# Patient Record
Sex: FEMALE | Race: OTHER | NOT HISPANIC OR LATINO
[De-identification: names, ages, dates, MRNs, and addresses within clinical notes are randomized per-mention and may not be internally consistent; named-entity substitution may affect disease eponyms.]

---

## 2021-05-04 ENCOUNTER — APPOINTMENT (OUTPATIENT)
Dept: BARIATRICS | Facility: CLINIC | Age: 51
End: 2021-05-04
Payer: MEDICAID

## 2021-05-04 VITALS
OXYGEN SATURATION: 100 % | HEIGHT: 66 IN | HEART RATE: 85 BPM | SYSTOLIC BLOOD PRESSURE: 166 MMHG | BODY MASS INDEX: 44.03 KG/M2 | TEMPERATURE: 97.1 F | DIASTOLIC BLOOD PRESSURE: 111 MMHG | WEIGHT: 274 LBS

## 2021-05-04 DIAGNOSIS — R12 HEARTBURN: ICD-10-CM

## 2021-05-04 DIAGNOSIS — I10 ESSENTIAL (PRIMARY) HYPERTENSION: ICD-10-CM

## 2021-05-04 DIAGNOSIS — M19.90 UNSPECIFIED OSTEOARTHRITIS, UNSPECIFIED SITE: ICD-10-CM

## 2021-05-04 PROCEDURE — 99204 OFFICE O/P NEW MOD 45 MIN: CPT

## 2021-05-04 PROCEDURE — 99072 ADDL SUPL MATRL&STAF TM PHE: CPT

## 2021-05-04 NOTE — HISTORY OF PRESENT ILLNESS
[de-identified] : Patient is a 51 yo F with a h/o morbid obesity and a BMI of 44 presenting for weight loss surgery consultation. Patient has h/o HTN on amlodipine and no PSH. Patient experiences GERD once every 3 days. \par \par We discussed at length surgical and non-surgical options and that non surgical approaches are unlikely to lead to long term, sustained weight loss. We also discussed that surgery alone is unlikely to be successful but should rather be seen as a tool for weight loss to be integrated with physical activity and nutritional counseling. The patient verbalized understanding and agrees to proceed with the evaluation. All risks of surgery were explained to the patient including the risks of leaks, infections, blood clots and death.\par

## 2021-05-04 NOTE — ASSESSMENT
[FreeTextEntry1] : Patient is a 49 yo F with a h/o morbid obesity and a BMI of 44 presenting for weight loss surgery consultation. Patient has h/o HTN on amlodipine and no PSH. Patient experiences GERD once every 3 days. \par \par We discussed at length surgical and non-surgical options and that non surgical approaches are unlikely to lead to long term, sustained weight loss. We also discussed that surgery alone is unlikely to be successful but should rather be seen as a tool for weight loss to be integrated with physical activity and nutritional counseling. The patient verbalized understanding and agrees to proceed with the evaluation. All risks of surgery were explained to the patient including the risks of leaks, infections, blood clots and death.

## 2021-05-05 LAB
25(OH)D3 SERPL-MCNC: 12.4 NG/ML
ALBUMIN SERPL ELPH-MCNC: 4.6 G/DL
ALP BLD-CCNC: 53 U/L
ALT SERPL-CCNC: 9 U/L
ANION GAP SERPL CALC-SCNC: 14 MMOL/L
APPEARANCE: CLEAR
APTT BLD: 34.2 SEC
AST SERPL-CCNC: 15 U/L
BACTERIA: ABNORMAL
BASOPHILS # BLD AUTO: 0.03 K/UL
BASOPHILS NFR BLD AUTO: 0.4 %
BILIRUB SERPL-MCNC: 0.4 MG/DL
BILIRUBIN URINE: NEGATIVE
BLOOD URINE: ABNORMAL
BUN SERPL-MCNC: 9 MG/DL
CA-I SERPL-SCNC: 1.26 MMOL/L
CALCIUM SERPL-MCNC: 9.7 MG/DL
CALCIUM SERPL-MCNC: 9.7 MG/DL
CHLORIDE SERPL-SCNC: 105 MMOL/L
CHOLEST SERPL-MCNC: 213 MG/DL
CO2 SERPL-SCNC: 22 MMOL/L
COLOR: YELLOW
CREAT SERPL-MCNC: 0.96 MG/DL
EOSINOPHIL # BLD AUTO: 0.16 K/UL
EOSINOPHIL NFR BLD AUTO: 2.1 %
FOLATE SERPL-MCNC: 3.8 NG/ML
GLUCOSE QUALITATIVE U: NEGATIVE
GLUCOSE SERPL-MCNC: 99 MG/DL
HCG SERPL QL: NEGATIVE
HCT VFR BLD CALC: 40.8 %
HDLC SERPL-MCNC: 43 MG/DL
HGB BLD-MCNC: 12.4 G/DL
HYALINE CASTS: 0 /LPF
IMM GRANULOCYTES NFR BLD AUTO: 0.3 %
INR PPP: 1.1 RATIO
IRON SATN MFR SERPL: 16 %
IRON SERPL-MCNC: 57 UG/DL
KETONES URINE: NEGATIVE
LDLC SERPL CALC-MCNC: 149 MG/DL
LEUKOCYTE ESTERASE URINE: ABNORMAL
LYMPHOCYTES # BLD AUTO: 2.05 K/UL
LYMPHOCYTES NFR BLD AUTO: 26.5 %
MAN DIFF?: NORMAL
MCHC RBC-ENTMCNC: 28.2 PG
MCHC RBC-ENTMCNC: 30.4 GM/DL
MCV RBC AUTO: 92.7 FL
MICROSCOPIC-UA: NORMAL
MONOCYTES # BLD AUTO: 0.53 K/UL
MONOCYTES NFR BLD AUTO: 6.9 %
NEUTROPHILS # BLD AUTO: 4.94 K/UL
NEUTROPHILS NFR BLD AUTO: 63.8 %
NITRITE URINE: NEGATIVE
NONHDLC SERPL-MCNC: 170 MG/DL
PAPP-A SERPL-ACNC: <1 MIU/ML
PARATHYROID HORMONE INTACT: 53 PG/ML
PH URINE: 6
PLATELET # BLD AUTO: 330 K/UL
POTASSIUM SERPL-SCNC: 4.3 MMOL/L
PREALB SERPL NEPH-MCNC: 25 MG/DL
PROT SERPL-MCNC: 7.6 G/DL
PROTEIN URINE: ABNORMAL
PT BLD: 12.9 SEC
RBC # BLD: 4.4 M/UL
RBC # FLD: 15.2 %
RED BLOOD CELLS URINE: 7 /HPF
SODIUM SERPL-SCNC: 140 MMOL/L
SPECIFIC GRAVITY URINE: 1.03
SQUAMOUS EPITHELIAL CELLS: 5 /HPF
TIBC SERPL-MCNC: 366 UG/DL
TRIGL SERPL-MCNC: 105 MG/DL
TSH SERPL-ACNC: 1.36 UIU/ML
UIBC SERPL-MCNC: 309 UG/DL
UROBILINOGEN URINE: NORMAL
VIT B12 SERPL-MCNC: 231 PG/ML
WBC # FLD AUTO: 7.73 K/UL
WHITE BLOOD CELLS URINE: 6 /HPF

## 2021-05-07 DIAGNOSIS — R06.02 SHORTNESS OF BREATH: ICD-10-CM

## 2021-05-08 LAB — ZINC SERPL-MCNC: 115 UG/DL

## 2021-05-09 LAB
A-TOCOPHEROL VIT E SERPL-MCNC: 10.8 MG/L
BETA+GAMMA TOCOPHEROL SERPL-MCNC: 2 MG/L
VIT A SERPL-MCNC: 33.6 UG/DL
VIT B1 SERPL-MCNC: 100.1 NMOL/L

## 2021-05-11 LAB
H PYLORI AB SER-ACNC: <5 UNITS
H PYLORI IGA SER-ACNC: 12.2 UNITS

## 2021-05-13 ENCOUNTER — APPOINTMENT (OUTPATIENT)
Dept: BARIATRICS | Facility: CLINIC | Age: 51
End: 2021-05-13
Payer: MEDICAID

## 2021-05-13 ENCOUNTER — APPOINTMENT (OUTPATIENT)
Dept: PULMONOLOGY | Facility: CLINIC | Age: 51
End: 2021-05-13
Payer: MEDICAID

## 2021-05-13 VITALS
HEART RATE: 94 BPM | OXYGEN SATURATION: 98 % | HEIGHT: 66 IN | DIASTOLIC BLOOD PRESSURE: 98 MMHG | BODY MASS INDEX: 44.03 KG/M2 | SYSTOLIC BLOOD PRESSURE: 166 MMHG | TEMPERATURE: 97.6 F | WEIGHT: 274 LBS

## 2021-05-13 DIAGNOSIS — Z01.811 ENCOUNTER FOR PREPROCEDURAL RESPIRATORY EXAMINATION: ICD-10-CM

## 2021-05-13 PROCEDURE — 99204 OFFICE O/P NEW MOD 45 MIN: CPT | Mod: 25

## 2021-05-13 PROCEDURE — 94618 PULMONARY STRESS TESTING: CPT

## 2021-05-13 PROCEDURE — 94726 PLETHYSMOGRAPHY LUNG VOLUMES: CPT

## 2021-05-13 PROCEDURE — ZZZZZ: CPT

## 2021-05-13 PROCEDURE — 99072 ADDL SUPL MATRL&STAF TM PHE: CPT

## 2021-05-13 PROCEDURE — 94729 DIFFUSING CAPACITY: CPT

## 2021-05-13 PROCEDURE — 94060 EVALUATION OF WHEEZING: CPT

## 2021-05-13 PROCEDURE — 97802 MEDICAL NUTRITION INDIV IN: CPT | Mod: 95

## 2021-05-13 RX ORDER — AMLODIPINE BESYLATE 10 MG/1
10 TABLET ORAL
Refills: 0 | Status: ACTIVE | COMMUNITY

## 2021-05-13 RX ORDER — OXYCODONE HYDROCHLORIDE AND ACETAMINOPHEN 10; 325 MG/1; MG/1
10-325 TABLET ORAL
Refills: 0 | Status: ACTIVE | COMMUNITY

## 2021-05-13 RX ORDER — TIZANIDINE 4 MG/1
4 TABLET ORAL
Refills: 0 | Status: ACTIVE | COMMUNITY

## 2021-05-13 NOTE — HISTORY OF PRESENT ILLNESS
[TextBox_4] : 50 year old female, never smoker with obesity and HTN was referred to pulmonary clinic by Dr. Lopez for pulmonary evaluation before bariatric surgery.\par Patient denies cough, SOB, chest pain or pulmonary illness in last 1 year. She is able to go three flights of stairs without any difficulty in breathing. Patient c/o mild snoring but no witnessed apnea, early morning headache or choking at night time or excessive daytime sleepiness. ESS score is 8.\par

## 2021-05-13 NOTE — REVIEW OF SYSTEMS
[Fever] : no fever [Chills] : no chills [Dry Eyes] : no dry eyes [Cough] : no cough [Eye Irritation] : no eye irritation [Sputum] : no sputum [Dyspnea] : no dyspnea [SOB on Exertion] : no sob on exertion [Chest Discomfort] : no chest discomfort [Orthopnea] : no orthopnea [Hay Fever] : no hay fever [Nasal Discharge] : no nasal discharge [GERD] : no gerd [Diarrhea] : no diarrhea [Diabetes] : no diabetes [Thyroid Problem] : no thyroid problem

## 2021-05-13 NOTE — PHYSICAL EXAM
[No Acute Distress] : no acute distress [Well Nourished] : well nourished [Normal Oropharynx] : normal oropharynx [II] : Mallampati Class: II [Normal Appearance] : normal appearance [No JVD] : no jvd [Normal Rate/Rhythm] : normal rate/rhythm [Normal S1, S2] : normal s1, s2 [No Resp Distress] : no resp distress [Clear to Auscultation Bilaterally] : clear to auscultation bilaterally [Benign] : benign [Not Tender] : not tender [Normal Color/ Pigmentation] : normal color/ pigmentation [No Rash] : no rash [No Focal Deficits] : no focal deficits [No Sensory Deficits] : no sensory deficits [Normal Affect] : normal affect [Oriented x3] : oriented x3

## 2021-05-13 NOTE — CONSULT LETTER
[Dear  ___] : Dear  [unfilled], [Consult Letter:] : I had the pleasure of evaluating your patient, [unfilled]. [Please see my note below.] : Please see my note below. [Consult Closing:] : Thank you very much for allowing me to participate in the care of this patient.  If you have any questions, please do not hesitate to contact me. [FreeTextEntry3] : Sincerely\par \par Natalio Shah MD [facility]\par , [employer] [employer]\par Associate , Pulmonary and Critical Care Fellowship\par Pulmonary and Critical Care\par [facility]\par Phone: [phone]\par

## 2021-05-13 NOTE — DISCUSSION/SUMMARY
[FreeTextEntry1] : 50 year old female, never smoker with obesity and HTN was referred to pulmonary clinic by Dr. Lopez for pulmonary evaluation before bariatric surgery.\par \par Review:\par PFT (5/21): FEv1 101, FEV1/FVC 85, TLC 82, DLCO 104\par 6 minute walk test (5/21): Oxygen saturation with ambulation 98%\par \par A/P\par Patient is asymptomatic from pulmonary stand point. PFt and 6 minute walk test is normal. CXr is not necessary. Home sleep study was ordered today.

## 2021-05-17 ENCOUNTER — OUTPATIENT (OUTPATIENT)
Dept: OUTPATIENT SERVICES | Facility: HOSPITAL | Age: 51
LOS: 1 days | Discharge: HOME | End: 2021-05-17
Payer: MEDICAID

## 2021-05-17 PROCEDURE — 43239 EGD BIOPSY SINGLE/MULTIPLE: CPT

## 2021-05-17 PROCEDURE — 43235 EGD DIAGNOSTIC BRUSH WASH: CPT

## 2021-05-17 PROCEDURE — 91035 G-ESOPH REFLX TST W/ELECTROD: CPT | Mod: 26

## 2021-05-17 PROCEDURE — 91035 G-ESOPH REFLX TST W/ELECTROD: CPT

## 2021-05-25 DIAGNOSIS — E66.9 OBESITY, UNSPECIFIED: ICD-10-CM

## 2021-05-25 DIAGNOSIS — M19.90 UNSPECIFIED OSTEOARTHRITIS, UNSPECIFIED SITE: ICD-10-CM

## 2021-05-25 DIAGNOSIS — R12 HEARTBURN: ICD-10-CM

## 2021-05-25 DIAGNOSIS — I10 ESSENTIAL (PRIMARY) HYPERTENSION: ICD-10-CM

## 2021-05-25 DIAGNOSIS — K20.90 ESOPHAGITIS, UNSPECIFIED WITHOUT BLEEDING: ICD-10-CM

## 2021-06-03 ENCOUNTER — OUTPATIENT (OUTPATIENT)
Dept: OUTPATIENT SERVICES | Facility: HOSPITAL | Age: 51
LOS: 1 days | End: 2021-06-03
Payer: MEDICAID

## 2021-06-03 ENCOUNTER — APPOINTMENT (OUTPATIENT)
Dept: SLEEP CENTER | Facility: HOME HEALTH | Age: 51
End: 2021-06-03
Payer: MEDICAID

## 2021-06-03 PROCEDURE — 95800 SLP STDY UNATTENDED: CPT

## 2021-06-03 PROCEDURE — 95800 SLP STDY UNATTENDED: CPT | Mod: 26

## 2021-06-07 DIAGNOSIS — G47.33 OBSTRUCTIVE SLEEP APNEA (ADULT) (PEDIATRIC): ICD-10-CM

## 2021-06-11 ENCOUNTER — NON-APPOINTMENT (OUTPATIENT)
Age: 51
End: 2021-06-11

## 2021-08-23 ENCOUNTER — APPOINTMENT (OUTPATIENT)
Dept: BARIATRICS | Facility: CLINIC | Age: 51
End: 2021-08-23
Payer: MEDICAID

## 2021-08-23 VITALS — HEIGHT: 66 IN | BODY MASS INDEX: 44.36 KG/M2 | WEIGHT: 276 LBS

## 2021-08-23 PROCEDURE — 97803 MED NUTRITION INDIV SUBSEQ: CPT | Mod: 95

## 2021-09-27 VITALS — HEIGHT: 66 IN | WEIGHT: 278 LBS | BODY MASS INDEX: 44.68 KG/M2

## 2021-10-04 ENCOUNTER — APPOINTMENT (OUTPATIENT)
Dept: BARIATRICS | Facility: CLINIC | Age: 51
End: 2021-10-04
Payer: MEDICAID

## 2021-10-04 VITALS — BODY MASS INDEX: 45 KG/M2 | WEIGHT: 280 LBS | HEIGHT: 66 IN

## 2021-10-04 PROCEDURE — 97803 MED NUTRITION INDIV SUBSEQ: CPT | Mod: 95

## 2021-10-07 ENCOUNTER — APPOINTMENT (OUTPATIENT)
Dept: BARIATRICS | Facility: CLINIC | Age: 51
End: 2021-10-07
Payer: MEDICAID

## 2021-10-07 ENCOUNTER — TRANSCRIPTION ENCOUNTER (OUTPATIENT)
Age: 51
End: 2021-10-07

## 2021-10-07 VITALS
WEIGHT: 280 LBS | TEMPERATURE: 97.6 F | DIASTOLIC BLOOD PRESSURE: 111 MMHG | HEART RATE: 102 BPM | BODY MASS INDEX: 45 KG/M2 | HEIGHT: 66 IN | OXYGEN SATURATION: 97 % | SYSTOLIC BLOOD PRESSURE: 164 MMHG

## 2021-10-07 PROCEDURE — 99214 OFFICE O/P EST MOD 30 MIN: CPT

## 2021-10-12 NOTE — ASSESSMENT
[FreeTextEntry1] : Patient is a 49 yo F with a h/o morbid obesity and a BMI of 44 presenting for weight loss surgery consultation. Patient has h/o HTN on amlodipine and no PSH. Patient experiences GERD once every 3 days. \par \par Had long discussion with patient regarding EGD and Bravo findings. We will take the patient for RYGB and PEH repair given Demeester score of 35 and 3 cm PEH on EGD. Patient agrees with therapeutic plan and wishes to proceed. \par \par We discussed at length surgical and non-surgical options and that non surgical approaches are unlikely to lead to long term, sustained weight loss. We also discussed that surgery alone is unlikely to be successful but should rather be seen as a tool for weight loss to be integrated with physical activity and nutritional counseling. The patient verbalized understanding and agrees to proceed with the evaluation. All risks of surgery were explained to the patient including the risks of leaks, infections, blood clots and death.

## 2021-10-12 NOTE — HISTORY OF PRESENT ILLNESS
[de-identified] : Patient is a 49 yo F with a h/o morbid obesity and a BMI of 44 presenting for weight loss surgery consultation. Patient has h/o HTN on amlodipine and no PSH. Patient experiences GERD once every 3 days. \par \par Had long discussion with patient regarding EGD and Bravo findings. We will take the patient for RYGB and PEH repair given Demeester score of 35 and 3 cm PEH on EGD. Patient agrees with therapeutic plan and wishes to proceed. \par \par We discussed at length surgical and non-surgical options and that non surgical approaches are unlikely to lead to long term, sustained weight loss. We also discussed that surgery alone is unlikely to be successful but should rather be seen as a tool for weight loss to be integrated with physical activity and nutritional counseling. The patient verbalized understanding and agrees to proceed with the evaluation. All risks of surgery were explained to the patient including the risks of leaks, infections, blood clots and death.

## 2021-10-30 ENCOUNTER — OUTPATIENT (OUTPATIENT)
Dept: OUTPATIENT SERVICES | Facility: HOSPITAL | Age: 51
LOS: 1 days | End: 2021-10-30
Payer: MEDICAID

## 2021-10-30 DIAGNOSIS — Z01.818 ENCOUNTER FOR OTHER PREPROCEDURAL EXAMINATION: ICD-10-CM

## 2021-10-30 PROCEDURE — 93010 ELECTROCARDIOGRAM REPORT: CPT

## 2021-10-30 PROCEDURE — 93005 ELECTROCARDIOGRAM TRACING: CPT

## 2021-10-30 PROCEDURE — 99214 OFFICE O/P EST MOD 30 MIN: CPT

## 2021-11-01 ENCOUNTER — LABORATORY RESULT (OUTPATIENT)
Age: 51
End: 2021-11-01

## 2021-11-01 ENCOUNTER — OUTPATIENT (OUTPATIENT)
Dept: OUTPATIENT SERVICES | Facility: HOSPITAL | Age: 51
LOS: 1 days | End: 2021-11-01
Payer: MEDICAID

## 2021-11-01 PROCEDURE — G9005: CPT

## 2021-11-02 VITALS
WEIGHT: 281.31 LBS | OXYGEN SATURATION: 98 % | RESPIRATION RATE: 16 BRPM | HEIGHT: 67 IN | SYSTOLIC BLOOD PRESSURE: 145 MMHG | HEART RATE: 91 BPM | DIASTOLIC BLOOD PRESSURE: 78 MMHG | TEMPERATURE: 98 F

## 2021-11-03 ENCOUNTER — INPATIENT (INPATIENT)
Facility: HOSPITAL | Age: 51
LOS: 4 days | Discharge: HOME | DRG: 620 | End: 2021-11-08
Attending: GENERAL ACUTE CARE HOSPITAL | Admitting: GENERAL ACUTE CARE HOSPITAL
Payer: MEDICAID

## 2021-11-03 ENCOUNTER — APPOINTMENT (OUTPATIENT)
Dept: BARIATRICS | Facility: HOSPITAL | Age: 51
End: 2021-11-03
Payer: MEDICAID

## 2021-11-03 ENCOUNTER — RESULT REVIEW (OUTPATIENT)
Age: 51
End: 2021-11-03

## 2021-11-03 DIAGNOSIS — Z98.890 OTHER SPECIFIED POSTPROCEDURAL STATES: Chronic | ICD-10-CM

## 2021-11-03 LAB
BLD GP AB SCN SERPL QL: NEGATIVE — SIGNIFICANT CHANGE UP
HCG SERPL-ACNC: 0 MIU/ML — SIGNIFICANT CHANGE UP
HCT VFR BLD CALC: 34.4 % — LOW (ref 34.5–45)
HGB BLD-MCNC: 11 G/DL — LOW (ref 11.5–15.5)
MCHC RBC-ENTMCNC: 27.9 PG — SIGNIFICANT CHANGE UP (ref 27–34)
MCHC RBC-ENTMCNC: 32 GM/DL — SIGNIFICANT CHANGE UP (ref 32–36)
MCV RBC AUTO: 87.3 FL — SIGNIFICANT CHANGE UP (ref 80–100)
NRBC # BLD: 0 /100 WBCS — SIGNIFICANT CHANGE UP (ref 0–0)
PLATELET # BLD AUTO: 302 K/UL — SIGNIFICANT CHANGE UP (ref 150–400)
RBC # BLD: 3.94 M/UL — SIGNIFICANT CHANGE UP (ref 3.8–5.2)
RBC # FLD: 13.9 % — SIGNIFICANT CHANGE UP (ref 10.3–14.5)
RH IG SCN BLD-IMP: POSITIVE — SIGNIFICANT CHANGE UP
WBC # BLD: 14.96 K/UL — HIGH (ref 3.8–10.5)
WBC # FLD AUTO: 14.96 K/UL — HIGH (ref 3.8–10.5)

## 2021-11-03 PROCEDURE — 88307 TISSUE EXAM BY PATHOLOGIST: CPT | Mod: 26

## 2021-11-03 PROCEDURE — 43235 EGD DIAGNOSTIC BRUSH WASH: CPT

## 2021-11-03 PROCEDURE — 43281 LAP PARAESOPHAG HERN REPAIR: CPT

## 2021-11-03 PROCEDURE — 43644 LAP GASTRIC BYPASS/ROUX-EN-Y: CPT | Mod: AS

## 2021-11-03 PROCEDURE — 43281 LAP PARAESOPHAG HERN REPAIR: CPT | Mod: AS

## 2021-11-03 PROCEDURE — 43644 LAP GASTRIC BYPASS/ROUX-EN-Y: CPT

## 2021-11-03 PROCEDURE — 99222 1ST HOSP IP/OBS MODERATE 55: CPT | Mod: 57,25

## 2021-11-03 RX ORDER — PANTOPRAZOLE SODIUM 20 MG/1
40 TABLET, DELAYED RELEASE ORAL DAILY
Refills: 0 | Status: DISCONTINUED | OUTPATIENT
Start: 2021-11-03 | End: 2021-11-08

## 2021-11-03 RX ORDER — GABAPENTIN 400 MG/1
300 CAPSULE ORAL ONCE
Refills: 0 | Status: COMPLETED | OUTPATIENT
Start: 2021-11-03 | End: 2021-11-03

## 2021-11-03 RX ORDER — GABAPENTIN 400 MG/1
800 CAPSULE ORAL DAILY
Refills: 0 | Status: DISCONTINUED | OUTPATIENT
Start: 2021-11-03 | End: 2021-11-03

## 2021-11-03 RX ORDER — HYDROMORPHONE HYDROCHLORIDE 2 MG/ML
1 INJECTION INTRAMUSCULAR; INTRAVENOUS; SUBCUTANEOUS EVERY 4 HOURS
Refills: 0 | Status: DISCONTINUED | OUTPATIENT
Start: 2021-11-03 | End: 2021-11-04

## 2021-11-03 RX ORDER — CLONAZEPAM 1 MG
1 TABLET ORAL
Qty: 0 | Refills: 0 | DISCHARGE

## 2021-11-03 RX ORDER — KETOROLAC TROMETHAMINE 30 MG/ML
15 SYRINGE (ML) INJECTION EVERY 6 HOURS
Refills: 0 | Status: DISCONTINUED | OUTPATIENT
Start: 2021-11-03 | End: 2021-11-07

## 2021-11-03 RX ORDER — ATORVASTATIN CALCIUM 80 MG/1
1 TABLET, FILM COATED ORAL
Qty: 0 | Refills: 0 | DISCHARGE

## 2021-11-03 RX ORDER — GABAPENTIN 400 MG/1
1 CAPSULE ORAL
Qty: 0 | Refills: 0 | DISCHARGE

## 2021-11-03 RX ORDER — ENOXAPARIN SODIUM 100 MG/ML
40 INJECTION SUBCUTANEOUS ONCE
Refills: 0 | Status: COMPLETED | OUTPATIENT
Start: 2021-11-03 | End: 2021-11-03

## 2021-11-03 RX ORDER — SCOPALAMINE 1 MG/3D
1 PATCH, EXTENDED RELEASE TRANSDERMAL ONCE
Refills: 0 | Status: COMPLETED | OUTPATIENT
Start: 2021-11-03 | End: 2021-11-03

## 2021-11-03 RX ORDER — ACETAMINOPHEN 500 MG
1000 TABLET ORAL ONCE
Refills: 0 | Status: COMPLETED | OUTPATIENT
Start: 2021-11-03 | End: 2021-11-03

## 2021-11-03 RX ORDER — ONDANSETRON 8 MG/1
4 TABLET, FILM COATED ORAL EVERY 6 HOURS
Refills: 0 | Status: DISCONTINUED | OUTPATIENT
Start: 2021-11-03 | End: 2021-11-08

## 2021-11-03 RX ORDER — HYDROMORPHONE HYDROCHLORIDE 2 MG/ML
0.25 INJECTION INTRAMUSCULAR; INTRAVENOUS; SUBCUTANEOUS
Refills: 0 | Status: DISCONTINUED | OUTPATIENT
Start: 2021-11-03 | End: 2021-11-03

## 2021-11-03 RX ORDER — GABAPENTIN 400 MG/1
800 CAPSULE ORAL THREE TIMES A DAY
Refills: 0 | Status: DISCONTINUED | OUTPATIENT
Start: 2021-11-03 | End: 2021-11-04

## 2021-11-03 RX ORDER — CLONAZEPAM 1 MG
0.5 TABLET ORAL THREE TIMES A DAY
Refills: 0 | Status: DISCONTINUED | OUTPATIENT
Start: 2021-11-03 | End: 2021-11-08

## 2021-11-03 RX ORDER — HYDROMORPHONE HYDROCHLORIDE 2 MG/ML
0.5 INJECTION INTRAMUSCULAR; INTRAVENOUS; SUBCUTANEOUS EVERY 4 HOURS
Refills: 0 | Status: DISCONTINUED | OUTPATIENT
Start: 2021-11-03 | End: 2021-11-04

## 2021-11-03 RX ORDER — SODIUM CHLORIDE 9 MG/ML
1000 INJECTION, SOLUTION INTRAVENOUS
Refills: 0 | Status: DISCONTINUED | OUTPATIENT
Start: 2021-11-03 | End: 2021-11-04

## 2021-11-03 RX ADMIN — HYDROMORPHONE HYDROCHLORIDE 0.25 MILLIGRAM(S): 2 INJECTION INTRAMUSCULAR; INTRAVENOUS; SUBCUTANEOUS at 14:15

## 2021-11-03 RX ADMIN — GABAPENTIN 800 MILLIGRAM(S): 400 CAPSULE ORAL at 22:46

## 2021-11-03 RX ADMIN — GABAPENTIN 300 MILLIGRAM(S): 400 CAPSULE ORAL at 07:21

## 2021-11-03 RX ADMIN — ENOXAPARIN SODIUM 40 MILLIGRAM(S): 100 INJECTION SUBCUTANEOUS at 07:20

## 2021-11-03 RX ADMIN — SODIUM CHLORIDE 170 MILLILITER(S): 9 INJECTION, SOLUTION INTRAVENOUS at 14:18

## 2021-11-03 RX ADMIN — GABAPENTIN 800 MILLIGRAM(S): 400 CAPSULE ORAL at 18:04

## 2021-11-03 RX ADMIN — ONDANSETRON 4 MILLIGRAM(S): 8 TABLET, FILM COATED ORAL at 15:56

## 2021-11-03 RX ADMIN — HYDROMORPHONE HYDROCHLORIDE 0.25 MILLIGRAM(S): 2 INJECTION INTRAMUSCULAR; INTRAVENOUS; SUBCUTANEOUS at 13:41

## 2021-11-03 RX ADMIN — Medication 400 MILLIGRAM(S): at 21:26

## 2021-11-03 RX ADMIN — PANTOPRAZOLE SODIUM 40 MILLIGRAM(S): 20 TABLET, DELAYED RELEASE ORAL at 18:02

## 2021-11-03 RX ADMIN — Medication 1000 MILLIGRAM(S): at 07:21

## 2021-11-03 RX ADMIN — Medication 400 MILLIGRAM(S): at 15:22

## 2021-11-03 RX ADMIN — SCOPALAMINE 1 PATCH: 1 PATCH, EXTENDED RELEASE TRANSDERMAL at 07:20

## 2021-11-03 RX ADMIN — Medication 1000 MILLIGRAM(S): at 16:05

## 2021-11-03 RX ADMIN — SCOPALAMINE 1 PATCH: 1 PATCH, EXTENDED RELEASE TRANSDERMAL at 21:56

## 2021-11-03 RX ADMIN — Medication 1000 MILLIGRAM(S): at 21:57

## 2021-11-03 RX ADMIN — HYDROMORPHONE HYDROCHLORIDE 0.25 MILLIGRAM(S): 2 INJECTION INTRAMUSCULAR; INTRAVENOUS; SUBCUTANEOUS at 16:05

## 2021-11-03 RX ADMIN — Medication 15 MILLIGRAM(S): at 22:48

## 2021-11-03 RX ADMIN — Medication 15 MILLIGRAM(S): at 23:00

## 2021-11-03 RX ADMIN — HYDROMORPHONE HYDROCHLORIDE 0.25 MILLIGRAM(S): 2 INJECTION INTRAMUSCULAR; INTRAVENOUS; SUBCUTANEOUS at 15:22

## 2021-11-03 RX ADMIN — HYDROMORPHONE HYDROCHLORIDE 0.5 MILLIGRAM(S): 2 INJECTION INTRAMUSCULAR; INTRAVENOUS; SUBCUTANEOUS at 17:52

## 2021-11-03 NOTE — H&P ADULT - NSICDXPASTMEDICALHX_GEN_ALL_CORE_FT
PAST MEDICAL HISTORY:  H/O low back pain     Hiatal hernia     HLD (hyperlipidemia)     HTN (hypertension)     Morbid obesity     Obstructive sleep apnea

## 2021-11-03 NOTE — PROGRESS NOTE ADULT - SUBJECTIVE AND OBJECTIVE BOX
POST-OPERATIVE NOTE    Procedure: Robotic assisted gastric bypass     Diagnosis/Indication: Morbid obesity     Surgeon: Dr. Lopez    S: Pt has no complaints. Denies CP, SOB, SILVEIRA, calf tenderness. Pain controlled with medication.    O:  T(C): --  T(F): --  HR: 97 (11-03-21 @ 15:22) (91 - 105)  BP: 114/76 (11-03-21 @ 15:22) (96/61 - 127/70)  RR: 40 (11-03-21 @ 15:22) (20 - 40)  SpO2: 100% (11-03-21 @ 15:22) (95% - 100%)  Wt(kg): --            Gen: NAD, resting comfortably in bed  C/V: NSR  Pulm: Nonlabored breathing, no respiratory distress  Abd: soft , non distended , TTP around incision site, incision clean dry and intact  Extrem: WWP, no calf edema, SCDs in place

## 2021-11-03 NOTE — H&P ADULT - ASSESSMENT
Pt is a 51 y/o F w/ PMH of HTN, HLD, GERD, GERALDO, morbid obesity that presents for a gastric bypass and EGD.  - to OR

## 2021-11-03 NOTE — PACU DISCHARGE NOTE - COMMENTS
patient AAOx4, pain controlled with current regimen, VSS transferred to floor stable after report given to Dinorah LUKE

## 2021-11-03 NOTE — BRIEF OPERATIVE NOTE - OPERATION/FINDINGS
Veres needle insufflation, Abdomen inspected. Robot docked, and targeted. Hiatal hernia repaired using Ethibond suture. Gastric pouch created using sureform 60 blue stapler x 3 loads and 32 bougie. GJ anastomosis created using stratafix x 2 sutures. JJ anastomosis created using sureform white stapler. EGD performed to evaluate anastomosis. Abdomen inspected. Hemostasis achieved. 12 mm port closed using endoclose device. skin closed with 4-0 monocryl.

## 2021-11-03 NOTE — H&P ADULT - HISTORY OF PRESENT ILLNESS
Pt is a 49 y/o F w/ PMH of HTN, HLD, GERD, GERALDO, morbid obesity that presents for a gastric bypass and EGD.

## 2021-11-03 NOTE — BRIEF OPERATIVE NOTE - NSICDXBRIEFPROCEDURE_GEN_ALL_CORE_FT
PROCEDURES:  Robot-assisted laparoscopic Bertrand-en-Y gastric bypass 03-Nov-2021 14:04:03  Andree Coronado  Robot-assisted repair of sliding hiatal hernia 03-Nov-2021 14:04:17  Andree Coronado

## 2021-11-04 ENCOUNTER — TRANSCRIPTION ENCOUNTER (OUTPATIENT)
Age: 51
End: 2021-11-04

## 2021-11-04 LAB
ANION GAP SERPL CALC-SCNC: 11 MMOL/L — SIGNIFICANT CHANGE UP (ref 5–17)
BUN SERPL-MCNC: 8 MG/DL — SIGNIFICANT CHANGE UP (ref 7–23)
CALCIUM SERPL-MCNC: 9.4 MG/DL — SIGNIFICANT CHANGE UP (ref 8.4–10.5)
CHLORIDE SERPL-SCNC: 106 MMOL/L — SIGNIFICANT CHANGE UP (ref 96–108)
CK MB CFR SERPL CALC: 4.8 NG/ML — SIGNIFICANT CHANGE UP (ref 0–6.7)
CK SERPL-CCNC: 834 U/L — HIGH (ref 25–170)
CO2 SERPL-SCNC: 24 MMOL/L — SIGNIFICANT CHANGE UP (ref 22–31)
COVID-19 NUCLEOCAPSID GAM AB INTERP: NEGATIVE — SIGNIFICANT CHANGE UP
COVID-19 NUCLEOCAPSID TOTAL GAM ANTIBODY RESULT: 0.08 INDEX — SIGNIFICANT CHANGE UP
COVID-19 SPIKE DOMAIN AB INTERP: POSITIVE
COVID-19 SPIKE DOMAIN ANTIBODY RESULT: >250 U/ML — HIGH
CREAT SERPL-MCNC: 0.92 MG/DL — SIGNIFICANT CHANGE UP (ref 0.5–1.3)
GLUCOSE SERPL-MCNC: 112 MG/DL — HIGH (ref 70–99)
HCT VFR BLD CALC: 33.4 % — LOW (ref 34.5–45)
HGB BLD-MCNC: 10.6 G/DL — LOW (ref 11.5–15.5)
MAGNESIUM SERPL-MCNC: 1.9 MG/DL — SIGNIFICANT CHANGE UP (ref 1.6–2.6)
MCHC RBC-ENTMCNC: 27.7 PG — SIGNIFICANT CHANGE UP (ref 27–34)
MCHC RBC-ENTMCNC: 31.7 GM/DL — LOW (ref 32–36)
MCV RBC AUTO: 87.4 FL — SIGNIFICANT CHANGE UP (ref 80–100)
NRBC # BLD: 0 /100 WBCS — SIGNIFICANT CHANGE UP (ref 0–0)
PHOSPHATE SERPL-MCNC: 3.5 MG/DL — SIGNIFICANT CHANGE UP (ref 2.5–4.5)
PLATELET # BLD AUTO: 302 K/UL — SIGNIFICANT CHANGE UP (ref 150–400)
POTASSIUM SERPL-MCNC: 4 MMOL/L — SIGNIFICANT CHANGE UP (ref 3.5–5.3)
POTASSIUM SERPL-SCNC: 4 MMOL/L — SIGNIFICANT CHANGE UP (ref 3.5–5.3)
RBC # BLD: 3.82 M/UL — SIGNIFICANT CHANGE UP (ref 3.8–5.2)
RBC # FLD: 14.1 % — SIGNIFICANT CHANGE UP (ref 10.3–14.5)
SARS-COV-2 IGG+IGM SERPL QL IA: 0.08 INDEX — SIGNIFICANT CHANGE UP
SARS-COV-2 IGG+IGM SERPL QL IA: >250 U/ML — HIGH
SARS-COV-2 IGG+IGM SERPL QL IA: NEGATIVE — SIGNIFICANT CHANGE UP
SARS-COV-2 IGG+IGM SERPL QL IA: POSITIVE
SODIUM SERPL-SCNC: 141 MMOL/L — SIGNIFICANT CHANGE UP (ref 135–145)
TROPONIN T SERPL-MCNC: 0.01 NG/ML — SIGNIFICANT CHANGE UP (ref 0–0.01)
WBC # BLD: 13.91 K/UL — HIGH (ref 3.8–10.5)
WBC # FLD AUTO: 13.91 K/UL — HIGH (ref 3.8–10.5)

## 2021-11-04 PROCEDURE — 93010 ELECTROCARDIOGRAM REPORT: CPT

## 2021-11-04 RX ORDER — GABAPENTIN 400 MG/1
800 CAPSULE ORAL THREE TIMES A DAY
Refills: 0 | Status: DISCONTINUED | OUTPATIENT
Start: 2021-11-04 | End: 2021-11-08

## 2021-11-04 RX ORDER — OXYCODONE HYDROCHLORIDE 5 MG/1
10 TABLET ORAL EVERY 4 HOURS
Refills: 0 | Status: DISCONTINUED | OUTPATIENT
Start: 2021-11-04 | End: 2021-11-08

## 2021-11-04 RX ORDER — ACETAMINOPHEN 500 MG
650 TABLET ORAL EVERY 6 HOURS
Refills: 0 | Status: DISCONTINUED | OUTPATIENT
Start: 2021-11-04 | End: 2021-11-08

## 2021-11-04 RX ORDER — ONDANSETRON 8 MG/1
4 TABLET, FILM COATED ORAL ONCE
Refills: 0 | Status: COMPLETED | OUTPATIENT
Start: 2021-11-04 | End: 2021-11-06

## 2021-11-04 RX ORDER — SODIUM CHLORIDE 9 MG/ML
1000 INJECTION, SOLUTION INTRAVENOUS
Refills: 0 | Status: DISCONTINUED | OUTPATIENT
Start: 2021-11-04 | End: 2021-11-06

## 2021-11-04 RX ORDER — OXYCODONE HYDROCHLORIDE 5 MG/1
5 TABLET ORAL EVERY 4 HOURS
Refills: 0 | Status: DISCONTINUED | OUTPATIENT
Start: 2021-11-04 | End: 2021-11-08

## 2021-11-04 RX ORDER — ENOXAPARIN SODIUM 100 MG/ML
40 INJECTION SUBCUTANEOUS EVERY 12 HOURS
Refills: 0 | Status: DISCONTINUED | OUTPATIENT
Start: 2021-11-04 | End: 2021-11-08

## 2021-11-04 RX ORDER — ONDANSETRON 8 MG/1
4 TABLET, FILM COATED ORAL ONCE
Refills: 0 | Status: COMPLETED | OUTPATIENT
Start: 2021-11-04 | End: 2021-11-04

## 2021-11-04 RX ORDER — ACETAMINOPHEN 500 MG
1000 TABLET ORAL ONCE
Refills: 0 | Status: COMPLETED | OUTPATIENT
Start: 2021-11-04 | End: 2021-11-04

## 2021-11-04 RX ORDER — OXYCODONE HYDROCHLORIDE 5 MG/1
5 TABLET ORAL
Refills: 0 | Status: DISCONTINUED | OUTPATIENT
Start: 2021-11-04 | End: 2021-11-04

## 2021-11-04 RX ADMIN — Medication 15 MILLIGRAM(S): at 05:40

## 2021-11-04 RX ADMIN — Medication 15 MILLIGRAM(S): at 17:27

## 2021-11-04 RX ADMIN — ONDANSETRON 4 MILLIGRAM(S): 8 TABLET, FILM COATED ORAL at 13:34

## 2021-11-04 RX ADMIN — Medication 15 MILLIGRAM(S): at 11:30

## 2021-11-04 RX ADMIN — Medication 400 MILLIGRAM(S): at 08:59

## 2021-11-04 RX ADMIN — ENOXAPARIN SODIUM 40 MILLIGRAM(S): 100 INJECTION SUBCUTANEOUS at 20:10

## 2021-11-04 RX ADMIN — ONDANSETRON 4 MILLIGRAM(S): 8 TABLET, FILM COATED ORAL at 19:26

## 2021-11-04 RX ADMIN — SCOPALAMINE 1 PATCH: 1 PATCH, EXTENDED RELEASE TRANSDERMAL at 07:35

## 2021-11-04 RX ADMIN — Medication 15 MILLIGRAM(S): at 05:13

## 2021-11-04 RX ADMIN — Medication 650 MILLIGRAM(S): at 17:42

## 2021-11-04 RX ADMIN — OXYCODONE HYDROCHLORIDE 5 MILLIGRAM(S): 5 TABLET ORAL at 19:26

## 2021-11-04 RX ADMIN — Medication 1000 MILLIGRAM(S): at 09:14

## 2021-11-04 RX ADMIN — Medication 15 MILLIGRAM(S): at 11:15

## 2021-11-04 RX ADMIN — GABAPENTIN 800 MILLIGRAM(S): 400 CAPSULE ORAL at 15:43

## 2021-11-04 RX ADMIN — Medication 15 MILLIGRAM(S): at 23:10

## 2021-11-04 RX ADMIN — Medication 15 MILLIGRAM(S): at 22:45

## 2021-11-04 RX ADMIN — Medication 1000 MILLIGRAM(S): at 03:45

## 2021-11-04 RX ADMIN — Medication 650 MILLIGRAM(S): at 17:12

## 2021-11-04 RX ADMIN — PANTOPRAZOLE SODIUM 40 MILLIGRAM(S): 20 TABLET, DELAYED RELEASE ORAL at 11:15

## 2021-11-04 RX ADMIN — Medication 400 MILLIGRAM(S): at 03:18

## 2021-11-04 RX ADMIN — ONDANSETRON 4 MILLIGRAM(S): 8 TABLET, FILM COATED ORAL at 08:59

## 2021-11-04 RX ADMIN — Medication 15 MILLIGRAM(S): at 17:12

## 2021-11-04 RX ADMIN — SCOPALAMINE 1 PATCH: 1 PATCH, EXTENDED RELEASE TRANSDERMAL at 19:01

## 2021-11-04 RX ADMIN — ENOXAPARIN SODIUM 40 MILLIGRAM(S): 100 INJECTION SUBCUTANEOUS at 08:59

## 2021-11-04 RX ADMIN — GABAPENTIN 800 MILLIGRAM(S): 400 CAPSULE ORAL at 22:43

## 2021-11-04 NOTE — DISCHARGE NOTE PROVIDER - CARE PROVIDER_API CALL
Jeremiah Lopez (MD)  Surgery  [address] [address]  Phone: [phone]  Fax: [phone]  Scheduled Appointment: 11/11/2021

## 2021-11-04 NOTE — DISCHARGE NOTE PROVIDER - NSDCCPCAREPLAN_GEN_ALL_CORE_FT
PRINCIPAL DISCHARGE DIAGNOSIS  Diagnosis: Morbid obesity  Assessment and Plan of Treatment: 51 y/o F w/ PMH of HTN, HLD, GERD, GERALDO, morbid obesity ( BMI 44)s/p robot-assisted laparoscopic Bertrand-en-Y gastric bypass and repair of sliding hiatal hernia 11/3. Pt tolerated the procedure well. At time of discharge, pt was tolerating a bariatric clear liquid diet, and pt's pain was controlled. Plan is to follow up with Dr. Lopez in the office.  1) Please take Tylenol 650mg every 4 to 6 hours as needed for moderate to severe pain control. Please do not exceed 4,000 mg of Tylenol a day.  2) Please take Protonix 40 mg by mouth daily.         PRINCIPAL DISCHARGE DIAGNOSIS  Diagnosis: Morbid obesity  Assessment and Plan of Treatment: 51 y/o F w/ PMH of HTN, HLD, GERD, GERALDO, morbid obesity ( BMI 44)s/p robot-assisted laparoscopic Bertrand-en-Y gastric bypass and repair of sliding hiatal hernia 11/3. Pt tolerated the procedure well. At time of discharge, pt was tolerating a bariatric clear liquid diet, and pt's pain was controlled. Plan is to follow up with Dr. Lopez in the office.  1) Please take Tylenol 650mg every 4 to 6 hours as needed for moderate pain control. Please do not exceed 4,000 mg of Tylenol a day.  2) Please take Protonix 40 mg by mouth daily.  3) You have also been prescribed 10 tablets of oxycodone. Please take as needed for severe pain (7-10). Do not take more than 4 tablets a day  4) Please crush all tablets before taking them.   5) Please discontinue taking amlodipine for now and follow up with your PCP for management of your Hypertesnion         PRINCIPAL DISCHARGE DIAGNOSIS  Diagnosis: Morbid obesity  Assessment and Plan of Treatment: 49 y/o F w/ PMH of HTN, HLD, GERD, GERALDO, morbid obesity ( BMI 44)s/p robot-assisted laparoscopic Bertrand-en-Y gastric bypass and repair of sliding hiatal hernia 11/3. Pt tolerated the procedure well. At time of discharge, pt was tolerating a bariatric clear liquid diet, and pt's pain was controlled. Plan is to follow up with Dr. Lopez in the office.  1) Please take Tylenol 650mg every 4 to 6 hours as needed for moderate pain control. Please do not exceed 4,000 mg of Tylenol a day.  2) Please take Protonix 40 mg by mouth daily.  3) You have also been prescribed 10 tablets of oxycodone. Please take as needed for severe pain (7-10). Do not take more than 4 tablets a day  4) Please crush all medications before taking them.

## 2021-11-04 NOTE — DIETITIAN INITIAL EVALUATION ADULT. - OTHER INFO
Pt admitted for elective sleeve gastrectomy on 11/3, currently POD 1. On bariatric CLD. Spoke with pt in room, has only consumed few sips of water, ordered tea and broth. Reports nausea with emesis as well as gas pain. Is ambulating in rogers and room.   Encouraged to reach goal fluid of 4oz every hour (1oz every 15 minutes).     Skin: lap sites noted, no breakdown   GI: abd-soft/ obese    Reviewed bariatric diet. Discussed CLD and progression to FLD on POD 2 with start of protein shakes. Goal of 60-80 grams protein per day and 64oz fluid  (primarily water) per day. Discussed drinking 4oz every hour. Also discussed vitamin/mineral supplements required. Informed pt will follow with outpatient dietitian to assist with diet progression. Provided RYGB diet handout. Answered pt's questions and verbalized understanding. Pt admitted for elective RYGB on 11/3, currently POD 1. On bariatric CLD. Spoke with pt in room, has only consumed few sips of water, ordered tea and broth. Reports nausea with emesis as well as gas pain. Is ambulating in rogers and room.   Encouraged to reach goal fluid of 4oz every hour (1oz every 15 minutes).     Skin: lap sites noted, no breakdown   GI: abd-soft/ obese    Reviewed bariatric diet. Discussed CLD and progression to FLD on POD 2 with start of protein shakes. Goal of 60-80 grams protein per day and 64oz fluid  (primarily water) per day. Discussed drinking 4oz every hour. Also discussed vitamin/mineral supplements required. Informed pt will follow with outpatient dietitian to assist with diet progression. Provided RYGB diet handout. Answered pt's questions and verbalized understanding.

## 2021-11-04 NOTE — DISCHARGE NOTE PROVIDER - NSDCFUADDINST_GEN_ALL_CORE_FT
Follow up with Dr. Lopez in 1 week. Call the office at  to schedule your appointment. You may shower; soap and water over incision sites. Do not scrub. Pat dry when done. No tub bathing or swimming until cleared. Keep incision sites out of the sun as scars will darken. No heavy lifting (>10lbs) or strenuous exercise. Diet: Bariatric Full Fluids. 60 grams protein daily.  64 fluid ounces water daily. Drink small sips throughout the day. Continue diet as outlined by paperwork received as a pre-operative patient. You should be urinating at least 3-4x per day. Call the office if you experience increasing abdominal pain, nausea, vomiting, or temperature >100.4F.  NO ASPIRIN OR NSAIDs until approved by Dr. Lopez . Avoid alcoholic beverages until cleared by Dr. Lopez .  1) Please take Tylenol 650mg every 4 to 6 hours as needed for moderate to severe pain control. Please do not exceed 4,000 mg of Tylenol a day.  2) Please take Protonix 40 mg by mouth daily.     Follow up with Dr. Lopez in 1 week. Call the office at  to schedule your appointment.     You may shower; soap and water over incision sites. Do not scrub. Pat dry when done. No tub bathing or swimming until cleared. Keep incision sites out of the sun as scars will darken. No heavy lifting (>10lbs) or strenuous exercise.     Diet: Bariatric Full Fluids. 60 grams protein daily.  64 fluid ounces water daily. Drink small sips throughout the day. Continue diet as outlined by paperwork received as a pre-operative patient. You should be urinating at least 3-4x per day.     Call the office if you experience increasing abdominal pain, nausea, vomiting, or temperature >100.4F.  NO ASPIRIN OR NSAIDs until approved by Dr. Lopez . Avoid alcoholic beverages until cleared by Dr. Lopez .    1) Please take Tylenol 650mg every 4 to 6 hours as needed for moderate to severe pain control. Please do not exceed 4,000 mg of Tylenol a day.  2) Please take Protonix 40 mg by mouth daily.     Follow up with Dr. Lopez in 1 week. Call the office at  to schedule your appointment.     You may shower; soap and water over incision sites. Do not scrub. Pat dry when done. No tub bathing or swimming until cleared. Keep incision sites out of the sun as scars will darken. No heavy lifting (>10lbs) or strenuous exercise.     Diet: Bariatric Full Fluids. 60 grams protein daily.  64 fluid ounces water daily. Drink small sips throughout the day. Continue diet as outlined by paperwork received as a pre-operative patient. You should be urinating at least 3-4x per day.     Call the office if you experience increasing abdominal pain, nausea, vomiting, or temperature >100.4F.  NO ASPIRIN OR NSAIDs until approved by Dr. Lopez . Avoid alcoholic beverages until cleared by Dr. Lopez .    Please discontinue taking amlodipine for now and follow up with your PCP for management of your Hypertension.    1) Please take Tylenol 650mg every 4 to 6 hours as needed for moderate pain control. Please do not exceed 4,000 mg of Tylenol a day. 2) Please take Protonix 40 mg by mouth daily. 3) You have also been prescribed 10 tablets of oxycodone. Please take as needed for severe pain (7-10). Do not take more than 4 tablets a day 4) Please crush all tablets before taking them.      Follow up with Dr. Lopez in 1 week. Call the office at  to schedule your appointment.     You may shower; soap and water over incision sites. Do not scrub. Pat dry when done. No tub bathing or swimming until cleared. Keep incision sites out of the sun as scars will darken. No heavy lifting (>10lbs) or strenuous exercise.     Diet: Bariatric Full Fluids. 60 grams protein daily.  64 fluid ounces water daily. Drink small sips throughout the day. Continue diet as outlined by paperwork received as a pre-operative patient. You should be urinating at least 3-4x per day.     Call the office if you experience increasing abdominal pain, nausea, vomiting, or temperature >100.4F.  NO ASPIRIN OR NSAIDs until approved by Dr. Lopez . Avoid alcoholic beverages until cleared by Dr. Lopez .    1) Please take Tylenol 650mg every 4 to 6 hours as needed for moderate pain control. Please do not exceed 4,000 mg of Tylenol a day. 2) Please take Protonix 40 mg by mouth daily. 3) You have also been prescribed 10 tablets of oxycodone. Please take as needed for severe pain (7-10). Do not take more than 4 tablets a day 4) Please crush all tablets before taking them.

## 2021-11-04 NOTE — DIETITIAN INITIAL EVALUATION ADULT. - ADD RECOMMEND
POD 2 advanced to FLD, drink 2-3 protein shakes/day (goal 60-80g protein), goal 64oz fluid/day. Take daily vitamin/minerals. Pt to adhere to bariatric diet progression.

## 2021-11-04 NOTE — DISCHARGE NOTE PROVIDER - NSDCMRMEDTOKEN_GEN_ALL_CORE_FT
amLODIPine 10 mg oral tablet: 1 tab(s) orally once a day  atorvastatin 10 mg oral tablet: 1 tab(s) orally once a day  clonazePAM 0.5 mg oral tablet: 1 tab(s) orally 3 times a day, As Needed  gabapentin 800 mg oral tablet: 1 tab(s) orally 3 times a day  tiZANidine 4 mg oral tablet: 2 tab(s) orally every 8 hours, As Needed   acetaminophen 325 mg oral tablet: 2 tab(s) orally every 6 hours as needed for moderate pain (4-6);  atorvastatin 10 mg oral tablet: 1 tab(s) orally once a day  clonazePAM 0.5 mg oral tablet: 1 tab(s) orally 3 times a day, As Needed  gabapentin 800 mg oral tablet: 1 tab(s) orally 3 times a day  oxyCODONE 5 mg oral capsule: 1 cap(s) orally every 6 hours as needed for severe pain (7-10); MDD:4  Protonix 40 mg oral delayed release tablet: 1 tab(s) orally once a day    acetaminophen 325 mg oral tablet: 2 tab(s) orally every 6 hours as needed for moderate pain (4-6);  amLODIPine 10 mg oral tablet: 1 tab(s) orally once a day  atorvastatin 10 mg oral tablet: 1 tab(s) orally once a day  clonazePAM 0.5 mg oral tablet: 1 tab(s) orally 3 times a day, As Needed  gabapentin 800 mg oral tablet: 1 tab(s) orally 3 times a day  oxyCODONE 5 mg oral capsule: 1 cap(s) orally every 6 hours as needed for severe pain (7-10); MDD:4  Protonix 40 mg oral delayed release tablet: 1 tab(s) orally once a day

## 2021-11-04 NOTE — DIETITIAN INITIAL EVALUATION ADULT. - OTHER CALCULATIONS
IBW for needs d/t % IBW >120. Current needs x 2 weeks then transition to maintenance needs of [id number] (20-25kcal/kg of IBW)

## 2021-11-04 NOTE — PROGRESS NOTE ADULT - SUBJECTIVE AND OBJECTIVE BOX
INTERVAL HPI/OVERNIGHT EVENTS: pt c/o of gas pain in epigastric region, post op hgb: 11.0, pass TOV (400cc), vss  11/3: Robot-assisted laparoscopic Bertrand-en-Y gastric bypass and repair of sliding hiatal hernia, POC wnl    STATUS POST:  Robotic assisted gastric bypass    POST OPERATIVE DAY #: 1    SUBJECTIVE: Patient examined bedside with chief resident. Patient complains of incisional and gas pain. Patient reports she is ambulating and using incentive spirometer. Patient denies nausea, emesis, SOB and chest pain. Patient making adequate urine output.        Vital Signs Last 24 Hrs  T(C): 37.3 (04 Nov 2021 04:51), Max: 37.5 (03 Nov 2021 20:10)  T(F): 99.2 (04 Nov 2021 04:51), Max: 99.5 (03 Nov 2021 20:10)  HR: 88 (04 Nov 2021 04:51) (81 - 105)  BP: 118/80 (04 Nov 2021 04:51) (96/61 - 131/85)  BP(mean): 89 (03 Nov 2021 17:22) (72 - 92)  RR: 18 (04 Nov 2021 04:51) (18 - 40)  SpO2: 93% (04 Nov 2021 04:51) (93% - 100%)  I&O's Detail    03 Nov 2021 07:01  -  04 Nov 2021 07:00  --------------------------------------------------------  IN:    Lactated Ringers: 1020 mL  Total IN: 1020 mL    OUT:    Voided (mL): 700 mL  Total OUT: 700 mL    Total NET: 320 mL          General: NAD, resting comfortably in bed  C/V: NSR  Pulm: Nonlabored breathing, no respiratory distress  Abd: Soft, non-distended, TTP around incision site, incision clean dry and intact.   Extrem: WWP, no edema, SCDs in place      LABS:                        10.6   13.91 )-----------( 302      ( 04 Nov 2021 06:35 )             33.4     11-04    141  |  106  |  8   ----------------------------<  112<H>  4.0   |  24  |  0.92    Ca    9.4      04 Nov 2021 06:35  Phos  3.5     11-04  Mg     1.9     11-04

## 2021-11-04 NOTE — DISCHARGE NOTE PROVIDER - HOSPITAL COURSE
51 y/o F w/ PMH of HTN, HLD, GERD, GERALDO, morbid obesity ( BMI 44)s/p robot-assisted laparoscopic Bertrand-en-Y gastric bypass and repair of sliding hiatal hernia 11/3. Pt tolerated the procedure well. At time of discharge, pt was tolerating a bariatric clear liquid diet, and pt's pain was controlled. Plan is to follow up with Dr. Lopez in the office.

## 2021-11-05 LAB
ALBUMIN SERPL ELPH-MCNC: 4.3 G/DL — SIGNIFICANT CHANGE UP (ref 3.3–5)
ALP SERPL-CCNC: 53 U/L — SIGNIFICANT CHANGE UP (ref 40–120)
ALT FLD-CCNC: 154 U/L — HIGH (ref 10–45)
ANION GAP SERPL CALC-SCNC: 13 MMOL/L — SIGNIFICANT CHANGE UP (ref 5–17)
ANION GAP SERPL CALC-SCNC: 17 MMOL/L — SIGNIFICANT CHANGE UP (ref 5–17)
AST SERPL-CCNC: 92 U/L — HIGH (ref 10–40)
BASE EXCESS BLDA CALC-SCNC: 1 MMOL/L — SIGNIFICANT CHANGE UP (ref -2–3)
BASOPHILS # BLD AUTO: 0.03 K/UL — SIGNIFICANT CHANGE UP (ref 0–0.2)
BASOPHILS NFR BLD AUTO: 0.2 % — SIGNIFICANT CHANGE UP (ref 0–2)
BILIRUB SERPL-MCNC: 0.6 MG/DL — SIGNIFICANT CHANGE UP (ref 0.2–1.2)
BLD GP AB SCN SERPL QL: NEGATIVE — SIGNIFICANT CHANGE UP
BUN SERPL-MCNC: 8 MG/DL — SIGNIFICANT CHANGE UP (ref 7–23)
BUN SERPL-MCNC: 9 MG/DL — SIGNIFICANT CHANGE UP (ref 7–23)
CALCIUM SERPL-MCNC: 9.5 MG/DL — SIGNIFICANT CHANGE UP (ref 8.4–10.5)
CALCIUM SERPL-MCNC: 9.6 MG/DL — SIGNIFICANT CHANGE UP (ref 8.4–10.5)
CHLORIDE SERPL-SCNC: 102 MMOL/L — SIGNIFICANT CHANGE UP (ref 96–108)
CHLORIDE SERPL-SCNC: 106 MMOL/L — SIGNIFICANT CHANGE UP (ref 96–108)
CK MB CFR SERPL CALC: 2.7 NG/ML — SIGNIFICANT CHANGE UP (ref 0–6.7)
CK SERPL-CCNC: 691 U/L — HIGH (ref 25–170)
CK SERPL-CCNC: 750 U/L — HIGH (ref 25–170)
CO2 BLDA-SCNC: 25 MMOL/L — HIGH (ref 19–24)
CO2 SERPL-SCNC: 20 MMOL/L — LOW (ref 22–31)
CO2 SERPL-SCNC: 25 MMOL/L — SIGNIFICANT CHANGE UP (ref 22–31)
CREAT SERPL-MCNC: 0.82 MG/DL — SIGNIFICANT CHANGE UP (ref 0.5–1.3)
CREAT SERPL-MCNC: 0.88 MG/DL — SIGNIFICANT CHANGE UP (ref 0.5–1.3)
EOSINOPHIL # BLD AUTO: 0.01 K/UL — SIGNIFICANT CHANGE UP (ref 0–0.5)
EOSINOPHIL NFR BLD AUTO: 0.1 % — SIGNIFICANT CHANGE UP (ref 0–6)
GLUCOSE BLDC GLUCOMTR-MCNC: 113 MG/DL — HIGH (ref 70–99)
GLUCOSE SERPL-MCNC: 100 MG/DL — HIGH (ref 70–99)
GLUCOSE SERPL-MCNC: 120 MG/DL — HIGH (ref 70–99)
HCO3 BLDA-SCNC: 24 MMOL/L — SIGNIFICANT CHANGE UP (ref 21–28)
HCT VFR BLD CALC: 35.1 % — SIGNIFICANT CHANGE UP (ref 34.5–45)
HCT VFR BLD CALC: 35.5 % — SIGNIFICANT CHANGE UP (ref 34.5–45)
HGB BLD-MCNC: 10.8 G/DL — LOW (ref 11.5–15.5)
HGB BLD-MCNC: 11.7 G/DL — SIGNIFICANT CHANGE UP (ref 11.5–15.5)
IMM GRANULOCYTES NFR BLD AUTO: 0.8 % — SIGNIFICANT CHANGE UP (ref 0–1.5)
LACTATE SERPL-SCNC: 1.2 MMOL/L — SIGNIFICANT CHANGE UP (ref 0.5–2)
LYMPHOCYTES # BLD AUTO: 1.23 K/UL — SIGNIFICANT CHANGE UP (ref 1–3.3)
LYMPHOCYTES # BLD AUTO: 8.7 % — LOW (ref 13–44)
MAGNESIUM SERPL-MCNC: 2.1 MG/DL — SIGNIFICANT CHANGE UP (ref 1.6–2.6)
MAGNESIUM SERPL-MCNC: 2.2 MG/DL — SIGNIFICANT CHANGE UP (ref 1.6–2.6)
MCHC RBC-ENTMCNC: 27.1 PG — SIGNIFICANT CHANGE UP (ref 27–34)
MCHC RBC-ENTMCNC: 28.3 PG — SIGNIFICANT CHANGE UP (ref 27–34)
MCHC RBC-ENTMCNC: 30.8 GM/DL — LOW (ref 32–36)
MCHC RBC-ENTMCNC: 33 GM/DL — SIGNIFICANT CHANGE UP (ref 32–36)
MCV RBC AUTO: 86 FL — SIGNIFICANT CHANGE UP (ref 80–100)
MCV RBC AUTO: 88 FL — SIGNIFICANT CHANGE UP (ref 80–100)
MONOCYTES # BLD AUTO: 0.5 K/UL — SIGNIFICANT CHANGE UP (ref 0–0.9)
MONOCYTES NFR BLD AUTO: 3.5 % — SIGNIFICANT CHANGE UP (ref 2–14)
NEUTROPHILS # BLD AUTO: 12.26 K/UL — HIGH (ref 1.8–7.4)
NEUTROPHILS NFR BLD AUTO: 86.7 % — HIGH (ref 43–77)
NRBC # BLD: 0 /100 WBCS — SIGNIFICANT CHANGE UP (ref 0–0)
NRBC # BLD: 0 /100 WBCS — SIGNIFICANT CHANGE UP (ref 0–0)
NT-PROBNP SERPL-SCNC: 186 PG/ML — SIGNIFICANT CHANGE UP (ref 0–300)
PCO2 BLDA: 31 MMHG — LOW (ref 32–35)
PH BLDA: 7.49 — HIGH (ref 7.35–7.45)
PHOSPHATE SERPL-MCNC: 2.4 MG/DL — LOW (ref 2.5–4.5)
PHOSPHATE SERPL-MCNC: 2.9 MG/DL — SIGNIFICANT CHANGE UP (ref 2.5–4.5)
PLATELET # BLD AUTO: 290 K/UL — SIGNIFICANT CHANGE UP (ref 150–400)
PLATELET # BLD AUTO: 290 K/UL — SIGNIFICANT CHANGE UP (ref 150–400)
PO2 BLDA: 92 MMHG — SIGNIFICANT CHANGE UP (ref 83–108)
POTASSIUM SERPL-MCNC: 3.1 MMOL/L — LOW (ref 3.5–5.3)
POTASSIUM SERPL-MCNC: 3.8 MMOL/L — SIGNIFICANT CHANGE UP (ref 3.5–5.3)
POTASSIUM SERPL-SCNC: 3.1 MMOL/L — LOW (ref 3.5–5.3)
POTASSIUM SERPL-SCNC: 3.8 MMOL/L — SIGNIFICANT CHANGE UP (ref 3.5–5.3)
PROT SERPL-MCNC: 8.3 G/DL — SIGNIFICANT CHANGE UP (ref 6–8.3)
RBC # BLD: 3.99 M/UL — SIGNIFICANT CHANGE UP (ref 3.8–5.2)
RBC # BLD: 4.13 M/UL — SIGNIFICANT CHANGE UP (ref 3.8–5.2)
RBC # FLD: 14.1 % — SIGNIFICANT CHANGE UP (ref 10.3–14.5)
RBC # FLD: 14.3 % — SIGNIFICANT CHANGE UP (ref 10.3–14.5)
RH IG SCN BLD-IMP: POSITIVE — SIGNIFICANT CHANGE UP
SAO2 % BLDA: 98 % — SIGNIFICANT CHANGE UP (ref 94–98)
SODIUM SERPL-SCNC: 140 MMOL/L — SIGNIFICANT CHANGE UP (ref 135–145)
SODIUM SERPL-SCNC: 143 MMOL/L — SIGNIFICANT CHANGE UP (ref 135–145)
TROPONIN T SERPL-MCNC: 0.01 NG/ML — SIGNIFICANT CHANGE UP (ref 0–0.01)
WBC # BLD: 11.45 K/UL — HIGH (ref 3.8–10.5)
WBC # BLD: 14.15 K/UL — HIGH (ref 3.8–10.5)
WBC # FLD AUTO: 11.45 K/UL — HIGH (ref 3.8–10.5)
WBC # FLD AUTO: 14.15 K/UL — HIGH (ref 3.8–10.5)

## 2021-11-05 PROCEDURE — 71045 X-RAY EXAM CHEST 1 VIEW: CPT | Mod: 26

## 2021-11-05 PROCEDURE — 36000 PLACE NEEDLE IN VEIN: CPT

## 2021-11-05 RX ORDER — POTASSIUM PHOSPHATE, MONOBASIC POTASSIUM PHOSPHATE, DIBASIC 236; 224 MG/ML; MG/ML
15 INJECTION, SOLUTION INTRAVENOUS ONCE
Refills: 0 | Status: COMPLETED | OUTPATIENT
Start: 2021-11-05 | End: 2021-11-05

## 2021-11-05 RX ORDER — ONDANSETRON 8 MG/1
4 TABLET, FILM COATED ORAL ONCE
Refills: 0 | Status: COMPLETED | OUTPATIENT
Start: 2021-11-05 | End: 2021-11-05

## 2021-11-05 RX ORDER — DEXAMETHASONE 0.5 MG/5ML
6 ELIXIR ORAL ONCE
Refills: 0 | Status: COMPLETED | OUTPATIENT
Start: 2021-11-05 | End: 2021-11-05

## 2021-11-05 RX ORDER — POTASSIUM CHLORIDE 20 MEQ
20 PACKET (EA) ORAL
Refills: 0 | Status: COMPLETED | OUTPATIENT
Start: 2021-11-05 | End: 2021-11-05

## 2021-11-05 RX ORDER — SODIUM CHLORIDE 9 MG/ML
500 INJECTION, SOLUTION INTRAVENOUS ONCE
Refills: 0 | Status: COMPLETED | OUTPATIENT
Start: 2021-11-05 | End: 2021-11-05

## 2021-11-05 RX ORDER — ACETAMINOPHEN 500 MG
1000 TABLET ORAL ONCE
Refills: 0 | Status: COMPLETED | OUTPATIENT
Start: 2021-11-05 | End: 2021-11-05

## 2021-11-05 RX ORDER — HYDROMORPHONE HYDROCHLORIDE 2 MG/ML
0.5 INJECTION INTRAMUSCULAR; INTRAVENOUS; SUBCUTANEOUS ONCE
Refills: 0 | Status: DISCONTINUED | OUTPATIENT
Start: 2021-11-05 | End: 2021-11-05

## 2021-11-05 RX ADMIN — ONDANSETRON 4 MILLIGRAM(S): 8 TABLET, FILM COATED ORAL at 18:00

## 2021-11-05 RX ADMIN — HYDROMORPHONE HYDROCHLORIDE 0.5 MILLIGRAM(S): 2 INJECTION INTRAMUSCULAR; INTRAVENOUS; SUBCUTANEOUS at 20:59

## 2021-11-05 RX ADMIN — Medication 50 MILLIEQUIVALENT(S): at 16:19

## 2021-11-05 RX ADMIN — Medication 15 MILLIGRAM(S): at 20:59

## 2021-11-05 RX ADMIN — Medication 15 MILLIGRAM(S): at 17:29

## 2021-11-05 RX ADMIN — HYDROMORPHONE HYDROCHLORIDE 0.5 MILLIGRAM(S): 2 INJECTION INTRAMUSCULAR; INTRAVENOUS; SUBCUTANEOUS at 13:47

## 2021-11-05 RX ADMIN — ONDANSETRON 4 MILLIGRAM(S): 8 TABLET, FILM COATED ORAL at 09:20

## 2021-11-05 RX ADMIN — ONDANSETRON 4 MILLIGRAM(S): 8 TABLET, FILM COATED ORAL at 15:04

## 2021-11-05 RX ADMIN — Medication 6 MILLIGRAM(S): at 08:05

## 2021-11-05 RX ADMIN — SCOPALAMINE 1 PATCH: 1 PATCH, EXTENDED RELEASE TRANSDERMAL at 06:02

## 2021-11-05 RX ADMIN — Medication 400 MILLIGRAM(S): at 12:44

## 2021-11-05 RX ADMIN — SCOPALAMINE 1 PATCH: 1 PATCH, EXTENDED RELEASE TRANSDERMAL at 20:57

## 2021-11-05 RX ADMIN — Medication 15 MILLIGRAM(S): at 11:27

## 2021-11-05 RX ADMIN — OXYCODONE HYDROCHLORIDE 5 MILLIGRAM(S): 5 TABLET ORAL at 20:59

## 2021-11-05 RX ADMIN — ONDANSETRON 4 MILLIGRAM(S): 8 TABLET, FILM COATED ORAL at 03:11

## 2021-11-05 RX ADMIN — Medication 15 MILLIGRAM(S): at 05:19

## 2021-11-05 RX ADMIN — PANTOPRAZOLE SODIUM 40 MILLIGRAM(S): 20 TABLET, DELAYED RELEASE ORAL at 11:12

## 2021-11-05 RX ADMIN — Medication 0.5 MILLIGRAM(S): at 18:30

## 2021-11-05 RX ADMIN — Medication 1000 MILLIGRAM(S): at 13:00

## 2021-11-05 RX ADMIN — Medication 15 MILLIGRAM(S): at 05:45

## 2021-11-05 RX ADMIN — SODIUM CHLORIDE 1000 MILLILITER(S): 9 INJECTION, SOLUTION INTRAVENOUS at 06:07

## 2021-11-05 RX ADMIN — Medication 50 MILLIEQUIVALENT(S): at 10:02

## 2021-11-05 RX ADMIN — POTASSIUM PHOSPHATE, MONOBASIC POTASSIUM PHOSPHATE, DIBASIC 62.5 MILLIMOLE(S): 236; 224 INJECTION, SOLUTION INTRAVENOUS at 20:59

## 2021-11-05 RX ADMIN — ENOXAPARIN SODIUM 40 MILLIGRAM(S): 100 INJECTION SUBCUTANEOUS at 09:19

## 2021-11-05 RX ADMIN — HYDROMORPHONE HYDROCHLORIDE 0.5 MILLIGRAM(S): 2 INJECTION INTRAMUSCULAR; INTRAVENOUS; SUBCUTANEOUS at 13:32

## 2021-11-05 RX ADMIN — Medication 50 MILLIEQUIVALENT(S): at 14:17

## 2021-11-05 RX ADMIN — Medication 15 MILLIGRAM(S): at 11:12

## 2021-11-05 RX ADMIN — Medication 6 MILLIGRAM(S): at 19:16

## 2021-11-05 RX ADMIN — ENOXAPARIN SODIUM 40 MILLIGRAM(S): 100 INJECTION SUBCUTANEOUS at 22:00

## 2021-11-05 NOTE — CHART NOTE - NSCHARTNOTEFT_GEN_A_CORE
Throughout the day patient has been complaining of intermittent chest pain, nausea, and small volume emesis. Emesis has been clear and consisting of phlegm. Patient has gotten decadron and zofran to help with nausea/vomiting as well as Tylenol and Dilaudid for pain control. Patient has good urine output throughout the day with a total of 825 mL. Vital signs have been stable throughout the day. Around 5:45 PM a rapid response was called as patient was Throughout the day patient has been complaining of intermittent chest pain, nausea, and emesis. Emesis has been clear and consisting of phlegm. Patient has gotten decadron and zofran to help with nausea/vomiting as well as Tylenol and Dilaudid for pain control. Patient has good urine output throughout the day with a total of 825 mL. Vital signs have been stable throughout the day. Around 5:45 PM a rapid response was called as patient was found on the floor. She said she passed out for a couple of seconds as she was standing from the chair and then was found on the floor. Patient was complaining of chest pain prior to fall. An EKG was obtained that showed sinus tachycardia but no signs of irregular rhythm or MI. Lactate was normal at 1.2. A neuro exam was performed that was unremarkable. Eventually patient stated that she felt better and that the chest pain is gone. It was determined that the chest pain is unlikely to be cardiac in origin and that the patient most likely had a vasovagal episode.  Patient was transferred to the telemetry unit for increased monitoring.       Physical Exam:   General: Patient was extremely agitated and nervous but became calmer by the end of the encounter  C/V: sinus tachycardia  Pulm: Nonlabored breathing, no respiratory distress  Abd: Soft, non-distended, TTP around incision site, incision clean dry and intact.   Extrem: WWP, no edema, SCDs in place  Neuro: AOx3, Cranial nerves grossly intact, motor and sensation grossly intact in b/l UE and LE Throughout the day patient has been complaining of intermittent chest pain, nausea, and emesis. Emesis has been clear and consisting of phlegm. Patient has gotten decadron and zofran to help with nausea/vomiting as well as Tylenol and Dilaudid for pain control. Patient has good urine output throughout the day with a total of 825 mL. Vital signs have been stable throughout the day. Around 5:45 PM a rapid response was called as patient was found on the floor. She said she passed out for a couple of seconds as she was standing from the chair and then was found on the floor. Patient was complaining of chest pain prior to fall. An EKG was obtained that showed sinus tachycardia but no signs of irregular rhythm or MI. Lactate was normal at 1.2. Trops are negative. A neuro exam was performed that was unremarkable. Eventually patient stated that she felt better and that the chest pain is gone. It was determined that the chest pain is unlikely to be cardiac in origin and that the patient most likely had a vasovagal episode.  Patient was transferred to the telemetry unit for increased monitoring.       Physical Exam:   General: Patient was extremely agitated and nervous but became calmer by the end of the encounter  C/V: sinus tachycardia  Pulm: Nonlabored breathing, no respiratory distress  Abd: Soft, non-distended, TTP around incision site, incision clean dry and intact.   Extrem: WWP, no edema, SCDs in place  Neuro: AOx3, Cranial nerves grossly intact, motor and sensation grossly intact in b/l UE and LE

## 2021-11-05 NOTE — PROVIDER CONTACT NOTE (CHANGE IN STATUS NOTIFICATION) - ACTION/TREATMENT ORDERED:
Chest xray, lab workup with ABG taken. Ativan and Zofran given. 1 L bolus of LR given. Vital repeated blood pressure stable as well as heart rate.

## 2021-11-05 NOTE — PROVIDER CONTACT NOTE (CHANGE IN STATUS NOTIFICATION) - BACKGROUND
Rapid Response Team called for pt having hypotension, vomiting and chest pain. Pt s/p gastric bypass sx.

## 2021-11-05 NOTE — CHART NOTE - NSCHARTNOTEFT_GEN_A_CORE
***Rapid Response Clinical Impact Nurse Practitioner Note***    INCOMPLETE NOTE    Patient is a 50y old  Female admitted for HPI:  Pt is a 49 y/o F w/ PMH of HTN, HLD, GERD, GERALDO, morbid obesity that presents for a gastric bypass and EGD.      (03 Nov 2021 06:39)      Rapid response team called @ _____ because    Patient was seen and examined at the bedside by the rapid response team.    Allergies    No Known Allergies    Intolerances        PAST MEDICAL & SURGICAL HISTORY:  Morbid obesity    HTN (hypertension)    Obstructive sleep apnea    Hiatal hernia    HLD (hyperlipidemia)    H/O low back pain    H/O Spinal surgery  lumbar; 2018        Vital Signs Last 24 Hrs  T(C): 37.3 (05 Nov 2021 19:35), Max: 37.7 (05 Nov 2021 17:07)  T(F): 99.1 (05 Nov 2021 19:35), Max: 99.8 (05 Nov 2021 17:07)  HR: 96 (05 Nov 2021 19:35) (82 - 101)  BP: 167/89 (05 Nov 2021 19:35) (143/92 - 167/89)  BP(mean): --  RR: 18 (05 Nov 2021 19:35) (16 - 19)  SpO2: 96% (05 Nov 2021 19:35) (93% - 96%)    Physical Exam  GENERAL: The patient is awake and alert in no apparent distress.   HEENT: Head is normocephalic and atraumatic. Extraocular muscles are intact. Mucous membranes are moist. No throat erythema/exudates no lymphadenopathy, no JVD,   NECK: Supple.  LUNGS: Clear to auscultation BL without wheezing, rales or rhonchi; respirations unlabored  HEART: Regular rate and rhythm ,+S1/+S2, no murmurs, rubs, gallops  ABDOMEN: Soft, nontender, and nondistended, no rebound, guarding rigidity, bowel sounds in all 4 quadrants  EXTREMITIES: Without any cyanosis, clubbing, rash, lesions or edema.  SKIN: No new rashes or lesions.  MSK: strength equal BL  VASCULAR: Radial and Dorsal pedal pulses palpable BL  NEUROLOGIC: Grossly intact.  PSYCH: No new changes.      11-04 @ 07:01  -  11-05 @ 07:00  --------------------------------------------------------  IN: 2425 mL / OUT: 1700 mL / NET: 725 mL    11-05 @ 07:01  -  11-05 @ 20:36  --------------------------------------------------------  IN: 1275 mL / OUT: 1375 mL / NET: -100 mL                              11.7   14.15 )-----------( 290      ( 05 Nov 2021 18:24 )             35.5         11-05    143  |  106  |  9   ----------------------------<  120<H>  3.8   |  20<L>  |  0.82    Ca    9.6      05 Nov 2021 18:24  Phos  2.9     11-05  Mg     2.2     11-05    TPro  8.3  /  Alb  4.3  /  TBili  0.6  /  DBili  x   /  AST  92<H>  /  ALT  154<H>  /  AlkPhos  53  11-05    ABG - ( 05 Nov 2021 18:12 )  pH, Arterial: 7.49  pH, Blood: x     /  pCO2: 31    /  pO2: 92    / HCO3: 24    / Base Excess: 1.0   /  SaO2: 98.0                 LIVER FUNCTIONS - ( 05 Nov 2021 18:24 )  Alb: 4.3 g/dL / Pro: 8.3 g/dL / ALK PHOS: 53 U/L / ALT: 154 U/L / AST: 92 U/L / GGT: x                      MEDICATIONS  (STANDING):  acetaminophen    Suspension .. 650 milliGRAM(s) Oral every 6 hours  enoxaparin Injectable 40 milliGRAM(s) SubCutaneous every 12 hours  gabapentin Solution 800 milliGRAM(s) Oral three times a day  ketorolac   Injectable 15 milliGRAM(s) IV Push every 6 hours  lactated ringers. 1000 milliLiter(s) (150 mL/Hr) IV Continuous <Continuous>  ondansetron Injectable 4 milliGRAM(s) IV Push once  pantoprazole  Injectable 40 milliGRAM(s) IV Push daily  potassium phosphate IVPB 15 milliMole(s) IV Intermittent once    MEDICATIONS  (PRN):  clonazePAM  Tablet 0.5 milliGRAM(s) Oral three times a day PRN anxiety  ondansetron Injectable 4 milliGRAM(s) IV Push every 6 hours PRN Nausea  oxyCODONE    Solution 10 milliGRAM(s) Oral every 4 hours PRN Severe Pain (7 - 10)  oxyCODONE    Solution 5 milliGRAM(s) Oral every 4 hours PRN Moderate Pain (4 - 6)      ASSESSMENT & PLAN    Assessment- Rapid Response called for 50y year old Female with a past medical history of     Plan- ***Rapid Response Clinical Impact Nurse Practitioner Note***    Patient is a 50y old Female with PMHx of HTN, GERD, GERALDO, morbid obesity admitted for EGD and planned gastric bypass surgery. Patient s/p robot-assisted laparoscopic Bertrand-en-Y gastric bypass and repair of sliding hiatal hernia.    Rapid response team called @ ~1750 after she was found on the floor. As per RN at the bedside, patient was sitting out of bed to chair and when staff went into the room, she was found on the floor. She was helped back to bed and was vomiting, complaining of chest pain and slightly hypotensive.     When writer arrived to the bedside, Patient was seen and examined at the bedside by the rapid response team.    Allergies    No Known Allergies    Intolerances        PAST MEDICAL & SURGICAL HISTORY:  Morbid obesity    HTN (hypertension)    Obstructive sleep apnea    Hiatal hernia    HLD (hyperlipidemia)    H/O low back pain    H/O Spinal surgery  lumbar; 2018        Vital Signs Last 24 Hrs  T(C): 37.3 (05 Nov 2021 19:35), Max: 37.7 (05 Nov 2021 17:07)  T(F): 99.1 (05 Nov 2021 19:35), Max: 99.8 (05 Nov 2021 17:07)  HR: 96 (05 Nov 2021 19:35) (82 - 101)  BP: 167/89 (05 Nov 2021 19:35) (143/92 - 167/89)  BP(mean): --  RR: 18 (05 Nov 2021 19:35) (16 - 19)  SpO2: 96% (05 Nov 2021 19:35) (93% - 96%)    Physical Exam  GENERAL: The patient is awake and alert in no apparent distress.   HEENT: Head is normocephalic and atraumatic. Extraocular muscles are intact. Mucous membranes are moist. No throat erythema/exudates no lymphadenopathy, no JVD,   NECK: Supple.  LUNGS: Clear to auscultation BL without wheezing, rales or rhonchi; respirations unlabored  HEART: Regular rate and rhythm ,+S1/+S2, no murmurs, rubs, gallops  ABDOMEN: Soft, nontender, and nondistended, no rebound, guarding rigidity, bowel sounds in all 4 quadrants  EXTREMITIES: Without any cyanosis, clubbing, rash, lesions or edema.  SKIN: No new rashes or lesions.  MSK: strength equal BL  VASCULAR: Radial and Dorsal pedal pulses palpable BL  NEUROLOGIC: Grossly intact.  PSYCH: No new changes.    11-04 @ 07:01  -  11-05 @ 07:00  --------------------------------------------------------  IN: 2425 mL / OUT: 1700 mL / NET: 725 mL    11-05 @ 07:01  -  11-05 @ 20:36  --------------------------------------------------------  IN: 1275 mL / OUT: 1375 mL / NET: -100 mL                 11.7   14.15 )-----------( 290      ( 05 Nov 2021 18:24 )             35.5         11-05    143  |  106  |  9   ----------------------------<  120<H>  3.8   |  20<L>  |  0.82    Ca    9.6      05 Nov 2021 18:24  Phos  2.9     11-05  Mg     2.2     11-05    TPro  8.3  /  Alb  4.3  /  TBili  0.6  /  DBili  x   /  AST  92<H>  /  ALT  154<H>  /  AlkPhos  53  11-05    ABG - ( 05 Nov 2021 18:12 )  pH, Arterial: 7.49  pH, Blood: x     /  pCO2: 31    /  pO2: 92    / HCO3: 24    / Base Excess: 1.0   /  SaO2: 98.0                 LIVER FUNCTIONS - ( 05 Nov 2021 18:24 )  Alb: 4.3 g/dL / Pro: 8.3 g/dL / ALK PHOS: 53 U/L / ALT: 154 U/L / AST: 92 U/L / GGT: x                      MEDICATIONS  (STANDING):  acetaminophen    Suspension .. 650 milliGRAM(s) Oral every 6 hours  enoxaparin Injectable 40 milliGRAM(s) SubCutaneous every 12 hours  gabapentin Solution 800 milliGRAM(s) Oral three times a day  ketorolac   Injectable 15 milliGRAM(s) IV Push every 6 hours  lactated ringers. 1000 milliLiter(s) (150 mL/Hr) IV Continuous <Continuous>  ondansetron Injectable 4 milliGRAM(s) IV Push once  pantoprazole  Injectable 40 milliGRAM(s) IV Push daily  potassium phosphate IVPB 15 milliMole(s) IV Intermittent once    MEDICATIONS  (PRN):  clonazePAM  Tablet 0.5 milliGRAM(s) Oral three times a day PRN anxiety  ondansetron Injectable 4 milliGRAM(s) IV Push every 6 hours PRN Nausea  oxyCODONE    Solution 10 milliGRAM(s) Oral every 4 hours PRN Severe Pain (7 - 10)  oxyCODONE    Solution 5 milliGRAM(s) Oral every 4 hours PRN Moderate Pain (4 - 6)      ASSESSMENT & PLAN    Assessment- Rapid Response called for 50y year old Female with a past medical history of     Plan- ***Rapid Response Clinical Impact Nurse Practitioner Note***    Patient is a 50y old Female with PMHx of HTN, GERD, GERALDO, morbid obesity admitted for EGD and planned gastric bypass surgery. Patient s/p robot-assisted laparoscopic Bertrand-en-Y gastric bypass and repair of sliding hiatal hernia.    Rapid response team called @ ~1750 after she was found on the floor. As per RN at the bedside, patient was sitting out of bed to chair and when staff went into the room, she was found on the floor. She was helped back to bed and was vomiting, complaining of chest pain and slightly hypotensive.     Patient was seen and examined at the bedside by the rapid response team. Vitals upon arrival: 's, 's, O2 sat 92-93% on room air, tachypneic to 28 bpm, temp 98.6 orally, blood glucose 113. Patient c/o persistent nausea with clear emesis. Ordered Zofran IVP 4 mg, STAT labs/ABG, STAT 12 lead EKG, urgent CXr and 1 Liter IVF. Patient states she has been experiencing right-sided chest pain since yesterday accompanied by nausea and has been treated with IV pain medication (DIlaudid/Tylenol). @ 1755, repeat VS with BP of 184/135, patient very anxious and upset about the lab draws. After about 20 minutes, BP improved to 144/75, HR decreased to 110's and patient stated she felt significant improvement and said "my chest pain is gone". Ativan 0.5 mg IVP given to patient and emotional support provided.     Allergies: No Known Allergies    PAST MEDICAL & SURGICAL HISTORY:  Morbid obesity  HTN (hypertension)  Obstructive sleep apnea  Hiatal hernia  HLD (hyperlipidemia)  H/O low back pain  H/O Spinal surgery  lumbar; 2018    REVIEW OF SYSTEMS:   General: no fatigue, fever or chills  HEENT:  no headache, vision changes, congestion, rhinorrhea, epistaxis  Respiratory: no cough/sputum, hemoptysis, SOB, wheezing, pleuritic pain  CV: + chest pain/discomfort; no palpitations, dyspnea, diaphoresis, orthopnea, edema, no swelling in calves, legs or feet  GI: +N&V, +abdominal pain; no dysphagia, melena, abdominal pain, hemorrhoids  : no urinary frequency, urgency, dysuria, hematuria  Skin: no rashes, itching, dryness  MSK: no joint pain, myalgia, joint swelling, leg cramps  Endocrine: no hot/cold intolerance, polyuria, polydipsia, abnormal bleeding  Neurologic: + anxiety; no weakness, dizziness/syncope, seizures, tremor, paresthesias, depression, confusion    Vital Signs Last 24 Hrs  T(C): 37.3 (05 Nov 2021 19:35), Max: 37.7 (05 Nov 2021 17:07)  T(F): 99.1 (05 Nov 2021 19:35), Max: 99.8 (05 Nov 2021 17:07)  HR: 96 (05 Nov 2021 19:35) (82 - 101)  BP: 167/89 (05 Nov 2021 19:35) (143/92 - 167/89)  BP(mean): --  RR: 18 (05 Nov 2021 19:35) (16 - 19)  SpO2: 96% (05 Nov 2021 19:35) (93% - 96%)    Physical Exam  GENERAL: The patient is awake and alert, in moderate distress.   HEENT: Head is normocephalic and atraumatic. Extraocular muscles are intact. Mucous membranes are moist, no JVD.   NECK: Supple.  LUNGS: Slightly tachypneic, lung sounds clear to auscultation BL without wheezing, rales or rhonchi; diminished at bases.  HEART: Sinus tachycardia, regular rate and rhythm ,+S1/+S2, no murmurs, rubs, gallops.  ABDOMEN: Soft, mildly tender around laparoscopy incisions, and mildly distended; reports not passing flatus; no rebound, guarding rigidity.  EXTREMITIES: Without any cyanosis, clubbing, rash, lesions or edema.  SKIN: No new rashes or lesions.  MSK: strength equal BL.  VASCULAR: + 1 Radial and Dorsal pedal pulses palpable BL.  NEUROLOGIC: Grossly intact.  PSYCH: Appears anxious.    11-04 @ 07:01  -  11-05 @ 07:00  --------------------------------------------------------  IN: 2425 mL / OUT: 1700 mL / NET: 725 mL    11-05 @ 07:01 - 11-05 @ 20:36  --------------------------------------------------------  IN: 1275 mL / OUT: 1375 mL / NET: -100 mL    LABS             11.7   14.15 )-----------( 290      ( 05 Nov 2021 18:24 )             35.5       11-05    143  |  106  |  9   ----------------------------<  120<H>  3.8   |  20<L>  |  0.82    Ca    9.6      05 Nov 2021 18:24  Phos  2.9     11-05  Mg     2.2     11-05    TPro  8.3  /  Alb  4.3  /  TBili  0.6  /  DBili  x   /  AST  92<H>  /  ALT  154<H>  /  AlkPhos  53  11-05     ABG - ( 05 Nov 2021 18:12 )  pH, Arterial: 7.49  pH, Blood: x     /  pCO2: 31    /  pO2: 92    / HCO3: 24    / Base Excess: 1.0   /  SaO2: 98.0      LIVER FUNCTIONS - ( 05 Nov 2021 18:24 )  Alb: 4.3 g/dL / Pro: 8.3 g/dL / ALK PHOS: 53 U/L / ALT: 154 U/L / AST: 92 U/L / GGT: x             MEDICATIONS  (STANDING):  acetaminophen    Suspension .. 650 milliGRAM(s) Oral every 6 hours  enoxaparin Injectable 40 milliGRAM(s) SubCutaneous every 12 hours  gabapentin Solution 800 milliGRAM(s) Oral three times a day  ketorolac   Injectable 15 milliGRAM(s) IV Push every 6 hours  lactated ringers. 1000 milliLiter(s) (150 mL/Hr) IV Continuous <Continuous>  ondansetron Injectable 4 milliGRAM(s) IV Push once  pantoprazole  Injectable 40 milliGRAM(s) IV Push daily  potassium phosphate IVPB 15 milliMole(s) IV Intermittent once    MEDICATIONS  (PRN):  clonazePAM  Tablet 0.5 milliGRAM(s) Oral three times a day PRN anxiety  ondansetron Injectable 4 milliGRAM(s) IV Push every 6 hours PRN Nausea  oxyCODONE    Solution 10 milliGRAM(s) Oral every 4 hours PRN Severe Pain (7 - 10)  oxyCODONE    Solution 5 milliGRAM(s) Oral every 4 hours PRN Moderate Pain (4 - 6)      ASSESSMENT & PLAN  Assessment- This is a 50y old Female with PMHx of HTN, GERD, GERALDO, morbid obesity s/p robot-assisted laparoscopic Bertrand-en-Y gastric bypass and repair of sliding hiatal hernia. Rapid response called for hypotension, chest pain and vomiting. Likely vasovagal/orthostatic episode in the setting of dehydration vs pain.    Plan-  -STAT EKG with no signs of cardiac injury/ischemia, sinus tachycardia  -Follow up STAT labs/ABG, Chest Xray  -ABG with respiratory alkalosis as patient is hyperventilating, no issues with oxygenation  -Patient satting well on room air (96-99% o2 sat), continue to monitor pulse oximetry  -Continue with neuro checks per protocol  -Continue with pain management  -Ativan 0.5mg IVP ordered for anxiety  -No evidence of seizure, patient mentating well, lactate 1.2  -Continue with Zofran for nausea  -Consider bowel regimen to prevent constipation  -Recommend maintenance IVF    -Monitor and replete electrolytes as needed  -Continue rx DVT ppx  -Maintain active T&S    Case discussed with primary team and ICU consult, will upgrade patient to tele for closer monitoring

## 2021-11-05 NOTE — PROGRESS NOTE ADULT - SUBJECTIVE AND OBJECTIVE BOX
STATUS POST:  Robot-assisted laparoscopic Bertrand-en-Y gastric bypass and repair of sliding hiatal hernia     O/N: 22:00 emesis 400cc clear liquid, zofran x 1, LR 500cc bolus for low urine o/p and emesis    SUBJECTIVE: Patient seen and examined bedside by chief resident. Patient endorses nausea and vomiting overnight. Has CP from vomiting. Patient denies abdominal pain. not able to tolerate fluids    enoxaparin Injectable 40 milliGRAM(s) SubCutaneous every 12 hours      Vital Signs Last 24 Hrs  T(C): 37.3 (05 Nov 2021 05:30), Max: 37.7 (04 Nov 2021 20:10)  T(F): 99.1 (05 Nov 2021 05:30), Max: 99.8 (04 Nov 2021 20:10)  HR: 84 (05 Nov 2021 05:30) (69 - 100)  BP: 145/93 (05 Nov 2021 05:30) (137/81 - 158/92)  BP(mean): --  RR: 18 (05 Nov 2021 05:30) (16 - 18)  SpO2: 93% (05 Nov 2021 05:30) (93% - 97%)  I&O's Detail    04 Nov 2021 07:01  -  05 Nov 2021 07:00  --------------------------------------------------------  IN:    Lactated Ringers: 170 mL    Lactated Ringers: 1575 mL    Lactated Ringers Bolus: 500 mL    Oral Fluid: 180 mL  Total IN: 2425 mL    OUT:    Emesis (mL): 400 mL    Voided (mL): 1300 mL  Total OUT: 1700 mL    Total NET: 725 mL          Physical Exam:  General: NAD, looks uncomfortable in bed and nauseous  C/V: NSR  Pulm: Nonlabored breathing, no respiratory distress  Abd: Soft, non-distended, TTP around incision site, incision clean dry and intact.   Extrem: WWP, no edema, SCDs in place    LABS:                        10.6   13.91 )-----------( 302      ( 04 Nov 2021 06:35 )             33.4     11-04    141  |  106  |  8   ----------------------------<  112<H>  4.0   |  24  |  0.92    Ca    9.4      04 Nov 2021 06:35  Phos  3.5     11-04  Mg     1.9     11-04            RADIOLOGY & ADDITIONAL STUDIES:    51 y/o F w/ PMH of HTN, HLD, GERD, GERALDO, morbid obesity ( BMI 44)s/p robot-assisted laparoscopic Bertrand-en-Y gastric bypass and repair of sliding hiatal hernia    - Pain/nausea control  - BCLD, IVF  - Protonix  - Lovenox for DVT ppx  -SCDs, OOB/A, IS  - AM labs  - Decadron for nausea

## 2021-11-05 NOTE — PROVIDER CONTACT NOTE (CHANGE IN STATUS NOTIFICATION) - ASSESSMENT
Pt found unconscious on floor. Pt transferred from floor to bed. Pt was complaining of N/V and stabbing chest pain. Team 2 made aware throughout day. Doctor Gerber did neuro assessment at bedside and decides ct scan of the head not needed at this time

## 2021-11-06 LAB
ANION GAP SERPL CALC-SCNC: 13 MMOL/L — SIGNIFICANT CHANGE UP (ref 5–17)
BUN SERPL-MCNC: 13 MG/DL — SIGNIFICANT CHANGE UP (ref 7–23)
CALCIUM SERPL-MCNC: 9.7 MG/DL — SIGNIFICANT CHANGE UP (ref 8.4–10.5)
CHLORIDE SERPL-SCNC: 107 MMOL/L — SIGNIFICANT CHANGE UP (ref 96–108)
CO2 SERPL-SCNC: 23 MMOL/L — SIGNIFICANT CHANGE UP (ref 22–31)
CREAT SERPL-MCNC: 0.92 MG/DL — SIGNIFICANT CHANGE UP (ref 0.5–1.3)
GLUCOSE SERPL-MCNC: 119 MG/DL — HIGH (ref 70–99)
HCT VFR BLD CALC: 34.7 % — SIGNIFICANT CHANGE UP (ref 34.5–45)
HGB BLD-MCNC: 11.6 G/DL — SIGNIFICANT CHANGE UP (ref 11.5–15.5)
MAGNESIUM SERPL-MCNC: 2.2 MG/DL — SIGNIFICANT CHANGE UP (ref 1.6–2.6)
MCHC RBC-ENTMCNC: 29.1 PG — SIGNIFICANT CHANGE UP (ref 27–34)
MCHC RBC-ENTMCNC: 33.4 GM/DL — SIGNIFICANT CHANGE UP (ref 32–36)
MCV RBC AUTO: 87 FL — SIGNIFICANT CHANGE UP (ref 80–100)
NRBC # BLD: 0 /100 WBCS — SIGNIFICANT CHANGE UP (ref 0–0)
PHOSPHATE SERPL-MCNC: 3.8 MG/DL — SIGNIFICANT CHANGE UP (ref 2.5–4.5)
PLATELET # BLD AUTO: 340 K/UL — SIGNIFICANT CHANGE UP (ref 150–400)
POTASSIUM SERPL-MCNC: 3.6 MMOL/L — SIGNIFICANT CHANGE UP (ref 3.5–5.3)
POTASSIUM SERPL-SCNC: 3.6 MMOL/L — SIGNIFICANT CHANGE UP (ref 3.5–5.3)
RBC # BLD: 3.99 M/UL — SIGNIFICANT CHANGE UP (ref 3.8–5.2)
RBC # FLD: 14 % — SIGNIFICANT CHANGE UP (ref 10.3–14.5)
SODIUM SERPL-SCNC: 143 MMOL/L — SIGNIFICANT CHANGE UP (ref 135–145)
WBC # BLD: 12.51 K/UL — HIGH (ref 3.8–10.5)
WBC # FLD AUTO: 12.51 K/UL — HIGH (ref 3.8–10.5)

## 2021-11-06 RX ORDER — SODIUM CHLORIDE 9 MG/ML
1000 INJECTION, SOLUTION INTRAVENOUS
Refills: 0 | Status: DISCONTINUED | OUTPATIENT
Start: 2021-11-06 | End: 2021-11-08

## 2021-11-06 RX ADMIN — OXYCODONE HYDROCHLORIDE 10 MILLIGRAM(S): 5 TABLET ORAL at 11:49

## 2021-11-06 RX ADMIN — Medication 15 MILLIGRAM(S): at 05:28

## 2021-11-06 RX ADMIN — PANTOPRAZOLE SODIUM 40 MILLIGRAM(S): 20 TABLET, DELAYED RELEASE ORAL at 11:26

## 2021-11-06 RX ADMIN — SODIUM CHLORIDE 120 MILLILITER(S): 9 INJECTION, SOLUTION INTRAVENOUS at 21:44

## 2021-11-06 RX ADMIN — SODIUM CHLORIDE 120 MILLILITER(S): 9 INJECTION, SOLUTION INTRAVENOUS at 13:10

## 2021-11-06 RX ADMIN — Medication 15 MILLIGRAM(S): at 18:58

## 2021-11-06 RX ADMIN — OXYCODONE HYDROCHLORIDE 10 MILLIGRAM(S): 5 TABLET ORAL at 11:26

## 2021-11-06 RX ADMIN — Medication 650 MILLIGRAM(S): at 11:26

## 2021-11-06 RX ADMIN — Medication 15 MILLIGRAM(S): at 11:27

## 2021-11-06 RX ADMIN — Medication 15 MILLIGRAM(S): at 11:48

## 2021-11-06 RX ADMIN — GABAPENTIN 800 MILLIGRAM(S): 400 CAPSULE ORAL at 14:00

## 2021-11-06 RX ADMIN — ONDANSETRON 4 MILLIGRAM(S): 8 TABLET, FILM COATED ORAL at 11:27

## 2021-11-06 RX ADMIN — Medication 15 MILLIGRAM(S): at 00:19

## 2021-11-06 RX ADMIN — ENOXAPARIN SODIUM 40 MILLIGRAM(S): 100 INJECTION SUBCUTANEOUS at 21:44

## 2021-11-06 RX ADMIN — ENOXAPARIN SODIUM 40 MILLIGRAM(S): 100 INJECTION SUBCUTANEOUS at 11:26

## 2021-11-06 RX ADMIN — ONDANSETRON 4 MILLIGRAM(S): 8 TABLET, FILM COATED ORAL at 21:44

## 2021-11-06 RX ADMIN — Medication 650 MILLIGRAM(S): at 11:48

## 2021-11-07 LAB
ANION GAP SERPL CALC-SCNC: 13 MMOL/L — SIGNIFICANT CHANGE UP (ref 5–17)
BUN SERPL-MCNC: 13 MG/DL — SIGNIFICANT CHANGE UP (ref 7–23)
CALCIUM SERPL-MCNC: 9.1 MG/DL — SIGNIFICANT CHANGE UP (ref 8.4–10.5)
CHLORIDE SERPL-SCNC: 107 MMOL/L — SIGNIFICANT CHANGE UP (ref 96–108)
CO2 SERPL-SCNC: 24 MMOL/L — SIGNIFICANT CHANGE UP (ref 22–31)
CREAT SERPL-MCNC: 0.85 MG/DL — SIGNIFICANT CHANGE UP (ref 0.5–1.3)
GLUCOSE SERPL-MCNC: 85 MG/DL — SIGNIFICANT CHANGE UP (ref 70–99)
HCT VFR BLD CALC: 36.2 % — SIGNIFICANT CHANGE UP (ref 34.5–45)
HGB BLD-MCNC: 11 G/DL — LOW (ref 11.5–15.5)
MAGNESIUM SERPL-MCNC: 2.1 MG/DL — SIGNIFICANT CHANGE UP (ref 1.6–2.6)
MCHC RBC-ENTMCNC: 27.1 PG — SIGNIFICANT CHANGE UP (ref 27–34)
MCHC RBC-ENTMCNC: 30.4 GM/DL — LOW (ref 32–36)
MCV RBC AUTO: 89.2 FL — SIGNIFICANT CHANGE UP (ref 80–100)
NRBC # BLD: 0 /100 WBCS — SIGNIFICANT CHANGE UP (ref 0–0)
PHOSPHATE SERPL-MCNC: 2.7 MG/DL — SIGNIFICANT CHANGE UP (ref 2.5–4.5)
PLATELET # BLD AUTO: 269 K/UL — SIGNIFICANT CHANGE UP (ref 150–400)
POTASSIUM SERPL-MCNC: 3.6 MMOL/L — SIGNIFICANT CHANGE UP (ref 3.5–5.3)
POTASSIUM SERPL-SCNC: 3.6 MMOL/L — SIGNIFICANT CHANGE UP (ref 3.5–5.3)
RBC # BLD: 4.06 M/UL — SIGNIFICANT CHANGE UP (ref 3.8–5.2)
RBC # FLD: 13.9 % — SIGNIFICANT CHANGE UP (ref 10.3–14.5)
SODIUM SERPL-SCNC: 144 MMOL/L — SIGNIFICANT CHANGE UP (ref 135–145)
WBC # BLD: 9.69 K/UL — SIGNIFICANT CHANGE UP (ref 3.8–10.5)
WBC # FLD AUTO: 9.69 K/UL — SIGNIFICANT CHANGE UP (ref 3.8–10.5)

## 2021-11-07 RX ADMIN — ENOXAPARIN SODIUM 40 MILLIGRAM(S): 100 INJECTION SUBCUTANEOUS at 23:27

## 2021-11-07 RX ADMIN — PANTOPRAZOLE SODIUM 40 MILLIGRAM(S): 20 TABLET, DELAYED RELEASE ORAL at 11:22

## 2021-11-07 RX ADMIN — Medication 15 MILLIGRAM(S): at 03:15

## 2021-11-07 RX ADMIN — ENOXAPARIN SODIUM 40 MILLIGRAM(S): 100 INJECTION SUBCUTANEOUS at 11:21

## 2021-11-07 RX ADMIN — Medication 15 MILLIGRAM(S): at 03:30

## 2021-11-07 RX ADMIN — SODIUM CHLORIDE 120 MILLILITER(S): 9 INJECTION, SOLUTION INTRAVENOUS at 12:30

## 2021-11-07 RX ADMIN — Medication 650 MILLIGRAM(S): at 11:22

## 2021-11-07 RX ADMIN — ONDANSETRON 4 MILLIGRAM(S): 8 TABLET, FILM COATED ORAL at 16:56

## 2021-11-07 NOTE — PROGRESS NOTE ADULT - ASSESSMENT
51 y/o F w/ PMH of HTN, HLD, GERD, GERALDO, morbid obesity ( BMI 44)s/p robot-assisted laparoscopic Bertrand-en-Y gastric bypass and repair of sliding hiatal hernia    -Pain/nausea control  -BCLD, IVF  -Protonix  -SCDs, OOB/A, IS  -AM labs  -Nutritional consult in AM  
49 y/o F w/ PMH of HTN, HLD, GERD, GERALDO, morbid obesity ( BMI 44)s/p robot-assisted laparoscopic Bertrand-en-Y gastric bypass and repair of sliding hiatal hernia  -Pain/nausea control  -BCLD, IVF  -Protonix  -Eliquis POD3 x30d  -SCDs, OOB/A, IS  -AM labs  -Nutritional consult in AM  
General: NAD, looks uncomfortable in bed and nauseous  C/V: NSR  Pulm: Nonlabored breathing, no respiratory distress  Abd: Soft, non-distended, TTP around incision site, incision clean dry and intact.   Extrem: WWP, no edema, SCDs in place

## 2021-11-07 NOTE — PROGRESS NOTE ADULT - SUBJECTIVE AND OBJECTIVE BOX
INTERVAL HPI/OVERNIGHT EVENTS: yana    STATUS POST:  Robot-assisted laparoscopic Bertrand-en-Y gastric bypass and repair of sliding hiatal hernia    SUBJECTIVE:  Patient seen and examined bedside by chief resident. Patient endorses nausea and vomiting overnight. Has CP from vomiting. Patient denies abdominal pain. able to tolerate fluids      MEDICATIONS  (STANDING):  acetaminophen    Suspension .. 650 milliGRAM(s) Oral every 6 hours  enoxaparin Injectable 40 milliGRAM(s) SubCutaneous every 12 hours  gabapentin Solution 800 milliGRAM(s) Oral three times a day  lactated ringers. 1000 milliLiter(s) (120 mL/Hr) IV Continuous <Continuous>  pantoprazole  Injectable 40 milliGRAM(s) IV Push daily    MEDICATIONS  (PRN):  clonazePAM  Tablet 0.5 milliGRAM(s) Oral three times a day PRN anxiety  ondansetron Injectable 4 milliGRAM(s) IV Push every 6 hours PRN Nausea  oxyCODONE    Solution 10 milliGRAM(s) Oral every 4 hours PRN Severe Pain (7 - 10)  oxyCODONE    Solution 5 milliGRAM(s) Oral every 4 hours PRN Moderate Pain (4 - 6)      Vital Signs Last 24 Hrs  T(C): 37.3 (07 Nov 2021 17:22), Max: 37.6 (07 Nov 2021 12:27)  T(F): 99.1 (07 Nov 2021 17:22), Max: 99.6 (07 Nov 2021 12:27)  HR: 69 (07 Nov 2021 17:22) (60 - 84)  BP: 149/87 (07 Nov 2021 17:22) (146/90 - 164/81)  BP(mean): 113 (07 Nov 2021 11:57) (101 - 116)  RR: 18 (07 Nov 2021 17:22) (18 - 20)  SpO2: 96% (07 Nov 2021 17:22) (95% - 100%)    PHYSICAL EXAM:      Constitutional: A&Ox3    Breasts:    Respiratory: non labored breathing, no respiratory distress    Cardiovascular: NSR, RRR    Gastrointestinal:                 Incision:    Genitourinary:    Extremities: (-) edema                  I&O's Detail    06 Nov 2021 08:01  -  07 Nov 2021 07:00  --------------------------------------------------------  IN:    Lactated Ringers: 2400 mL    Lactated Ringers: 750 mL    Oral Fluid: 450 mL  Total IN: 3600 mL    OUT:    Emesis (mL): 475 mL    Voided (mL): 1350 mL  Total OUT: 1825 mL    Total NET: 1775 mL      07 Nov 2021 07:01  -  07 Nov 2021 18:50  --------------------------------------------------------  IN:    Lactated Ringers: 720 mL    Oral Fluid: 320 mL  Total IN: 1040 mL    OUT:    Voided (mL): 1400 mL  Total OUT: 1400 mL    Total NET: -360 mL          LABS:                        11.0   9.69  )-----------( 269      ( 07 Nov 2021 10:45 )             36.2     11-07    144  |  107  |  13  ----------------------------<  85  3.6   |  24  |  0.85    Ca    9.1      07 Nov 2021 10:45  Phos  2.7     11-07  Mg     2.1     11-07            RADIOLOGY & ADDITIONAL STUDIES:

## 2021-11-08 ENCOUNTER — TRANSCRIPTION ENCOUNTER (OUTPATIENT)
Age: 51
End: 2021-11-08

## 2021-11-08 VITALS
HEART RATE: 71 BPM | RESPIRATION RATE: 18 BRPM | OXYGEN SATURATION: 97 % | TEMPERATURE: 98 F | SYSTOLIC BLOOD PRESSURE: 164 MMHG | DIASTOLIC BLOOD PRESSURE: 98 MMHG

## 2021-11-08 LAB
ANION GAP SERPL CALC-SCNC: 13 MMOL/L — SIGNIFICANT CHANGE UP (ref 5–17)
BUN SERPL-MCNC: 8 MG/DL — SIGNIFICANT CHANGE UP (ref 7–23)
CALCIUM SERPL-MCNC: 9.5 MG/DL — SIGNIFICANT CHANGE UP (ref 8.4–10.5)
CHLORIDE SERPL-SCNC: 102 MMOL/L — SIGNIFICANT CHANGE UP (ref 96–108)
CO2 SERPL-SCNC: 25 MMOL/L — SIGNIFICANT CHANGE UP (ref 22–31)
CREAT SERPL-MCNC: 0.85 MG/DL — SIGNIFICANT CHANGE UP (ref 0.5–1.3)
GLUCOSE SERPL-MCNC: 80 MG/DL — SIGNIFICANT CHANGE UP (ref 70–99)
HCT VFR BLD CALC: 35.5 % — SIGNIFICANT CHANGE UP (ref 34.5–45)
HGB BLD-MCNC: 10.9 G/DL — LOW (ref 11.5–15.5)
MAGNESIUM SERPL-MCNC: 2 MG/DL — SIGNIFICANT CHANGE UP (ref 1.6–2.6)
MCHC RBC-ENTMCNC: 26.8 PG — LOW (ref 27–34)
MCHC RBC-ENTMCNC: 30.7 GM/DL — LOW (ref 32–36)
MCV RBC AUTO: 87.2 FL — SIGNIFICANT CHANGE UP (ref 80–100)
NRBC # BLD: 0 /100 WBCS — SIGNIFICANT CHANGE UP (ref 0–0)
PHOSPHATE SERPL-MCNC: 3.3 MG/DL — SIGNIFICANT CHANGE UP (ref 2.5–4.5)
PLATELET # BLD AUTO: 310 K/UL — SIGNIFICANT CHANGE UP (ref 150–400)
POTASSIUM SERPL-MCNC: 3.4 MMOL/L — LOW (ref 3.5–5.3)
POTASSIUM SERPL-SCNC: 3.4 MMOL/L — LOW (ref 3.5–5.3)
RBC # BLD: 4.07 M/UL — SIGNIFICANT CHANGE UP (ref 3.8–5.2)
RBC # FLD: 13.8 % — SIGNIFICANT CHANGE UP (ref 10.3–14.5)
SODIUM SERPL-SCNC: 140 MMOL/L — SIGNIFICANT CHANGE UP (ref 135–145)
WBC # BLD: 10.81 K/UL — HIGH (ref 3.8–10.5)
WBC # FLD AUTO: 10.81 K/UL — HIGH (ref 3.8–10.5)

## 2021-11-08 RX ORDER — POTASSIUM CHLORIDE 20 MEQ
20 PACKET (EA) ORAL
Refills: 0 | Status: COMPLETED | OUTPATIENT
Start: 2021-11-08 | End: 2021-11-08

## 2021-11-08 RX ORDER — AMLODIPINE BESYLATE 2.5 MG/1
1 TABLET ORAL
Qty: 0 | Refills: 0 | DISCHARGE

## 2021-11-08 RX ORDER — TIZANIDINE 4 MG/1
2 TABLET ORAL
Qty: 0 | Refills: 0 | DISCHARGE

## 2021-11-08 RX ORDER — OXYCODONE HYDROCHLORIDE 5 MG/1
1 TABLET ORAL
Qty: 10 | Refills: 0
Start: 2021-11-08 | End: 2021-11-09

## 2021-11-08 RX ORDER — ACETAMINOPHEN 500 MG
2 TABLET ORAL
Qty: 16 | Refills: 0
Start: 2021-11-08 | End: 2021-11-09

## 2021-11-08 RX ORDER — AMLODIPINE BESYLATE 2.5 MG/1
1 TABLET ORAL
Qty: 0 | Refills: 0 | DISCHARGE
Start: 2021-11-08

## 2021-11-08 RX ORDER — PANTOPRAZOLE SODIUM 20 MG/1
1 TABLET, DELAYED RELEASE ORAL
Qty: 30 | Refills: 0
Start: 2021-11-08 | End: 2021-12-07

## 2021-11-08 RX ADMIN — Medication 50 MILLIEQUIVALENT(S): at 11:14

## 2021-11-08 RX ADMIN — ENOXAPARIN SODIUM 40 MILLIGRAM(S): 100 INJECTION SUBCUTANEOUS at 11:09

## 2021-11-08 RX ADMIN — Medication 650 MILLIGRAM(S): at 11:14

## 2021-11-08 RX ADMIN — Medication 50 MILLIEQUIVALENT(S): at 09:04

## 2021-11-08 RX ADMIN — PANTOPRAZOLE SODIUM 40 MILLIGRAM(S): 20 TABLET, DELAYED RELEASE ORAL at 11:09

## 2021-11-08 NOTE — PROGRESS NOTE ADULT - SUBJECTIVE AND OBJECTIVE BOX
STATUS POST:  Robot-assisted laparoscopic Bertrand-en-Y gastric bypass and repair of sliding hiatal hernia     SUBJECTIVE: Patient seen and examined bedside by chief resident. Patient states that her CP is much better now. She is tolerating 4 oz/hr of fluids. States that she has no n/v. And is OOB and ambulating.     enoxaparin Injectable 40 milliGRAM(s) SubCutaneous every 12 hours      Vital Signs Last 24 Hrs  T(C): 36.9 (08 Nov 2021 12:40), Max: 37.4 (07 Nov 2021 20:40)  T(F): 98.5 (08 Nov 2021 12:40), Max: 99.3 (07 Nov 2021 20:40)  HR: 71 (08 Nov 2021 12:40) (63 - 71)  BP: 164/98 (08 Nov 2021 12:40) (145/82 - 164/98)  BP(mean): 108 (08 Nov 2021 08:40) (108 - 108)  RR: 18 (08 Nov 2021 12:40) (18 - 18)  SpO2: 97% (08 Nov 2021 12:40) (95% - 97%)  I&O's Detail    07 Nov 2021 07:01  -  08 Nov 2021 07:00  --------------------------------------------------------  IN:    Lactated Ringers: 2280 mL    Oral Fluid: 320 mL  Total IN: 2600 mL    OUT:    Voided (mL): 3050 mL  Total OUT: 3050 mL    Total NET: -450 mL      08 Nov 2021 07:01  -  08 Nov 2021 16:32  --------------------------------------------------------  IN:    Oral Fluid: 450 mL  Total IN: 450 mL    OUT:    Voided (mL): 400 mL  Total OUT: 400 mL    Total NET: 50 mL          Physical Exam:  General: NAD, looks uncomfortable in bed and nauseous  C/V: NSR  Pulm: Nonlabored breathing, no respiratory distress  Abd: Soft, non-distended, ND incision clean dry and intact.   Extrem: WWP, no edema, SCDs in place    LABS:                        10.9   10.81 )-----------( 310      ( 08 Nov 2021 07:21 )             35.5     11-08    140  |  102  |  8   ----------------------------<  80  3.4<L>   |  25  |  0.85    Ca    9.5      08 Nov 2021 07:21  Phos  3.3     11-08  Mg     2.0     11-08            RADIOLOGY & ADDITIONAL STUDIES:      51 y/o F w/ PMH of HTN, HLD, GERD, GERALDO, morbid obesity ( BMI 44)s/p robot-assisted laparoscopic Bertrand-en-Y gastric bypass and repair of sliding hiatal hernia    - Pain/nausea control  - BCLD  - Protonix  - Lovenox for DVT ppx  - SCDs, OOB/A, IS  - D/C today STATUS POST:  Robot-assisted laparoscopic Bertrand-en-Y gastric bypass and repair of sliding hiatal hernia    O/N: yana, vss, afriad to take PO pills     SUBJECTIVE: Patient seen and examined bedside by chief resident. Patient states that her CP is much better now. She is tolerating 4 oz/hr of fluids. States that she has no n/v. And is OOB and ambulating.     enoxaparin Injectable 40 milliGRAM(s) SubCutaneous every 12 hours      Vital Signs Last 24 Hrs  T(C): 36.9 (08 Nov 2021 12:40), Max: 37.4 (07 Nov 2021 20:40)  T(F): 98.5 (08 Nov 2021 12:40), Max: 99.3 (07 Nov 2021 20:40)  HR: 71 (08 Nov 2021 12:40) (63 - 71)  BP: 164/98 (08 Nov 2021 12:40) (145/82 - 164/98)  BP(mean): 108 (08 Nov 2021 08:40) (108 - 108)  RR: 18 (08 Nov 2021 12:40) (18 - 18)  SpO2: 97% (08 Nov 2021 12:40) (95% - 97%)  I&O's Detail    07 Nov 2021 07:01  -  08 Nov 2021 07:00  --------------------------------------------------------  IN:    Lactated Ringers: 2280 mL    Oral Fluid: 320 mL  Total IN: 2600 mL    OUT:    Voided (mL): 3050 mL  Total OUT: 3050 mL    Total NET: -450 mL      08 Nov 2021 07:01  -  08 Nov 2021 16:32  --------------------------------------------------------  IN:    Oral Fluid: 450 mL  Total IN: 450 mL    OUT:    Voided (mL): 400 mL  Total OUT: 400 mL    Total NET: 50 mL          Physical Exam:  General: NAD, looks uncomfortable in bed and nauseous  C/V: NSR  Pulm: Nonlabored breathing, no respiratory distress  Abd: Soft, non-distended, ND incision clean dry and intact.   Extrem: WWP, no edema, SCDs in place    LABS:                        10.9   10.81 )-----------( 310      ( 08 Nov 2021 07:21 )             35.5     11-08    140  |  102  |  8   ----------------------------<  80  3.4<L>   |  25  |  0.85    Ca    9.5      08 Nov 2021 07:21  Phos  3.3     11-08  Mg     2.0     11-08            RADIOLOGY & ADDITIONAL STUDIES:      51 y/o F w/ PMH of HTN, HLD, GERD, GERALDO, morbid obesity ( BMI 44)s/p robot-assisted laparoscopic Bertrand-en-Y gastric bypass and repair of sliding hiatal hernia    - Pain/nausea control  - BCLD  - Protonix  - Lovenox for DVT ppx  - SCDs, OOB/A, IS  - D/C today

## 2021-11-08 NOTE — DISCHARGE NOTE NURSING/CASE MANAGEMENT/SOCIAL WORK - PATIENT PORTAL LINK FT
You can access the FollowMyHealth Patient Portal offered by [facility] by registering at the following website: [email or web address]. By joining [facility]’s FollowMyHealth portal, you will also be able to view your health information using other applications (apps) compatible with our system.

## 2021-11-08 NOTE — PROGRESS NOTE ADULT - REASON FOR ADMISSION
gastric bypass and EGD

## 2021-11-09 DIAGNOSIS — Z71.89 OTHER SPECIFIED COUNSELING: ICD-10-CM

## 2021-11-09 LAB — SURGICAL PATHOLOGY STUDY: SIGNIFICANT CHANGE UP

## 2021-11-11 ENCOUNTER — APPOINTMENT (OUTPATIENT)
Dept: BARIATRICS | Facility: CLINIC | Age: 51
End: 2021-11-11

## 2021-11-11 VITALS
TEMPERATURE: 97.6 F | OXYGEN SATURATION: 100 % | HEART RATE: 93 BPM | SYSTOLIC BLOOD PRESSURE: 162 MMHG | BODY MASS INDEX: 42.27 KG/M2 | WEIGHT: 263 LBS | DIASTOLIC BLOOD PRESSURE: 96 MMHG | HEIGHT: 66 IN

## 2021-11-11 PROBLEM — Z87.39 PERSONAL HISTORY OF OTHER DISEASES OF THE MUSCULOSKELETAL SYSTEM AND CONNECTIVE TISSUE: Chronic | Status: ACTIVE | Noted: 2021-11-02

## 2021-11-11 PROBLEM — G47.33 OBSTRUCTIVE SLEEP APNEA (ADULT) (PEDIATRIC): Chronic | Status: ACTIVE | Noted: 2021-11-02

## 2021-11-11 PROBLEM — I10 ESSENTIAL (PRIMARY) HYPERTENSION: Chronic | Status: ACTIVE | Noted: 2021-11-02

## 2021-11-11 PROBLEM — K44.9 DIAPHRAGMATIC HERNIA WITHOUT OBSTRUCTION OR GANGRENE: Chronic | Status: ACTIVE | Noted: 2021-11-02

## 2021-11-11 PROBLEM — E66.01 MORBID (SEVERE) OBESITY DUE TO EXCESS CALORIES: Chronic | Status: ACTIVE | Noted: 2021-11-02

## 2021-11-11 PROBLEM — E78.5 HYPERLIPIDEMIA, UNSPECIFIED: Chronic | Status: ACTIVE | Noted: 2021-11-02

## 2021-11-12 DIAGNOSIS — E87.3 ALKALOSIS: ICD-10-CM

## 2021-11-12 DIAGNOSIS — E78.5 HYPERLIPIDEMIA, UNSPECIFIED: ICD-10-CM

## 2021-11-12 DIAGNOSIS — R07.9 CHEST PAIN, UNSPECIFIED: ICD-10-CM

## 2021-11-12 DIAGNOSIS — R11.2 NAUSEA WITH VOMITING, UNSPECIFIED: ICD-10-CM

## 2021-11-12 DIAGNOSIS — K44.9 DIAPHRAGMATIC HERNIA WITHOUT OBSTRUCTION OR GANGRENE: ICD-10-CM

## 2021-11-12 DIAGNOSIS — E66.01 MORBID (SEVERE) OBESITY DUE TO EXCESS CALORIES: ICD-10-CM

## 2021-11-12 DIAGNOSIS — I95.1 ORTHOSTATIC HYPOTENSION: ICD-10-CM

## 2021-11-12 DIAGNOSIS — R00.0 TACHYCARDIA, UNSPECIFIED: ICD-10-CM

## 2021-11-12 DIAGNOSIS — G47.33 OBSTRUCTIVE SLEEP APNEA (ADULT) (PEDIATRIC): ICD-10-CM

## 2021-11-12 DIAGNOSIS — I10 ESSENTIAL (PRIMARY) HYPERTENSION: ICD-10-CM

## 2021-12-02 ENCOUNTER — APPOINTMENT (OUTPATIENT)
Dept: BARIATRICS | Facility: CLINIC | Age: 51
End: 2021-12-02

## 2021-12-02 VITALS
SYSTOLIC BLOOD PRESSURE: 142 MMHG | OXYGEN SATURATION: 98 % | HEART RATE: 92 BPM | HEIGHT: 66 IN | TEMPERATURE: 97.6 F | DIASTOLIC BLOOD PRESSURE: 86 MMHG | BODY MASS INDEX: 40.24 KG/M2 | WEIGHT: 250.38 LBS

## 2021-12-02 PROCEDURE — 82962 GLUCOSE BLOOD TEST: CPT

## 2021-12-02 PROCEDURE — 86900 BLOOD TYPING SEROLOGIC ABO: CPT

## 2021-12-02 PROCEDURE — 82550 ASSAY OF CK (CPK): CPT

## 2021-12-02 PROCEDURE — 83605 ASSAY OF LACTIC ACID: CPT

## 2021-12-02 PROCEDURE — S2900: CPT

## 2021-12-02 PROCEDURE — 85025 COMPLETE CBC W/AUTO DIFF WBC: CPT

## 2021-12-02 PROCEDURE — 86769 SARS-COV-2 COVID-19 ANTIBODY: CPT

## 2021-12-02 PROCEDURE — 80053 COMPREHEN METABOLIC PANEL: CPT

## 2021-12-02 PROCEDURE — 84100 ASSAY OF PHOSPHORUS: CPT

## 2021-12-02 PROCEDURE — 83880 ASSAY OF NATRIURETIC PEPTIDE: CPT

## 2021-12-02 PROCEDURE — 85027 COMPLETE CBC AUTOMATED: CPT

## 2021-12-02 PROCEDURE — 71045 X-RAY EXAM CHEST 1 VIEW: CPT

## 2021-12-02 PROCEDURE — 86901 BLOOD TYPING SEROLOGIC RH(D): CPT

## 2021-12-02 PROCEDURE — 80048 BASIC METABOLIC PNL TOTAL CA: CPT

## 2021-12-02 PROCEDURE — 84702 CHORIONIC GONADOTROPIN TEST: CPT

## 2021-12-02 PROCEDURE — C9399: CPT

## 2021-12-02 PROCEDURE — 82803 BLOOD GASES ANY COMBINATION: CPT

## 2021-12-02 PROCEDURE — C1889: CPT

## 2021-12-02 PROCEDURE — 85610 PROTHROMBIN TIME: CPT

## 2021-12-02 PROCEDURE — 88307 TISSUE EXAM BY PATHOLOGIST: CPT

## 2021-12-02 PROCEDURE — 85730 THROMBOPLASTIN TIME PARTIAL: CPT

## 2021-12-02 PROCEDURE — 36415 COLL VENOUS BLD VENIPUNCTURE: CPT

## 2021-12-02 PROCEDURE — 82553 CREATINE MB FRACTION: CPT

## 2021-12-02 PROCEDURE — 84484 ASSAY OF TROPONIN QUANT: CPT

## 2021-12-02 PROCEDURE — 83735 ASSAY OF MAGNESIUM: CPT

## 2021-12-02 PROCEDURE — 93005 ELECTROCARDIOGRAM TRACING: CPT

## 2021-12-02 PROCEDURE — 86850 RBC ANTIBODY SCREEN: CPT

## 2022-01-06 ENCOUNTER — APPOINTMENT (OUTPATIENT)
Dept: BARIATRICS | Facility: CLINIC | Age: 52
End: 2022-01-06

## 2022-01-06 VITALS
OXYGEN SATURATION: 98 % | WEIGHT: 231 LBS | HEART RATE: 81 BPM | DIASTOLIC BLOOD PRESSURE: 88 MMHG | BODY MASS INDEX: 37.12 KG/M2 | HEIGHT: 66 IN | SYSTOLIC BLOOD PRESSURE: 137 MMHG | TEMPERATURE: 98 F

## 2022-02-11 ENCOUNTER — APPOINTMENT (OUTPATIENT)
Dept: BARIATRICS | Facility: CLINIC | Age: 52
End: 2022-02-11
Payer: MEDICAID

## 2022-02-11 PROCEDURE — 99214 OFFICE O/P EST MOD 30 MIN: CPT | Mod: 95

## 2022-02-14 NOTE — ASSESSMENT
[FreeTextEntry1] : Patient is a 49 yo F with a h/o morbid obesity 3 months out from a  RYGB and PEH repair given Demeester score of 35 and 3 cm PEH on EGD. She lost 50 lbs. She is doing well and her GERD is resolved. Plan for f/u in clinic in 1 month.

## 2022-02-14 NOTE — HISTORY OF PRESENT ILLNESS
[Home] : at home, [unfilled] , at the time of the visit. [Medical Office: ([facility])___] : at the medical office located in  [Verbal consent obtained from patient] : the patient, [unfilled] [de-identified] : Patient is a 49 yo F with a h/o morbid obesity 3 months out from a  RYGB and PEH repair given Demeester score of 35 and 3 cm PEH on EGD. She lost 50 lbs. She is doing well and her GERD is resolved. Plan for f/u in clinic in 1 month.

## 2022-03-10 ENCOUNTER — APPOINTMENT (OUTPATIENT)
Dept: BARIATRICS | Facility: CLINIC | Age: 52
End: 2022-03-10
Payer: MEDICAID

## 2022-03-10 VITALS
WEIGHT: 214.5 LBS | SYSTOLIC BLOOD PRESSURE: 158 MMHG | TEMPERATURE: 97.3 F | OXYGEN SATURATION: 99 % | BODY MASS INDEX: 34.47 KG/M2 | DIASTOLIC BLOOD PRESSURE: 96 MMHG | HEART RATE: 91 BPM | HEIGHT: 66 IN

## 2022-03-10 PROCEDURE — 99215 OFFICE O/P EST HI 40 MIN: CPT

## 2022-03-23 NOTE — END OF VISIT
[Time Spent: ___ minutes] : I have spent [unfilled] minutes of time on the encounter. [FreeTextEntry3] : All medical record entries made by the Scribe were at my, Dr. Lopez's, discretion and personally dictated by me on 03/10/2022. I have reviewed the chart and agree that the record accurately reflects my personal performance of the history, physical exam, assessment and plan. I have also personally directed, reviewed and agreed to the chart.

## 2022-03-23 NOTE — ADDENDUM
[FreeTextEntry1] : This note was written by Ioana Mckoy on 03/10/2022  acting as scribe for Dr. Lopez.

## 2022-03-23 NOTE — PHYSICAL EXAM
[Obese] : obese [Normal] : affect appropriate [de-identified] : normal respiration [de-identified] : incisions well healed

## 2022-03-23 NOTE — ASSESSMENT
[FreeTextEntry1] : Pt is a 52 y/o F doing well 4 months s/p RYGB and PEH repair 11/3/21. Down ~66 lbs. GERD symptoms resolved. I advised pt to increase exercise for additional weight loss. Advised her to use a stationary bike or other exercise methods that don't require the shoulders as she has a shoulder injury. Patient will meet our dietician to discuss nutritional counseling. She will follow up in 2 months.

## 2022-03-23 NOTE — HISTORY OF PRESENT ILLNESS
[de-identified] : Pt is a 50 y/o F 4 months s/p RYGB and PEH repair 11/3/21 who presents today for follow up. She is doing well overall. She is down ~66 lbs. Reports she went from a clothes size 22 to size 12. She denies GERD symptomatology. She endorses some shoulder pain which is limiting her exercise.\par

## 2022-06-23 DIAGNOSIS — Z00.00 ENCOUNTER FOR GENERAL ADULT MEDICAL EXAMINATION W/OUT ABNORMAL FINDINGS: ICD-10-CM

## 2022-06-23 DIAGNOSIS — E66.01 MORBID (SEVERE) OBESITY DUE TO EXCESS CALORIES: ICD-10-CM

## 2022-07-14 ENCOUNTER — APPOINTMENT (OUTPATIENT)
Dept: BARIATRICS | Facility: CLINIC | Age: 52
End: 2022-07-14

## 2023-11-15 NOTE — H&P ADULT - NSHPRISKHIVSCREEN_GEN_ALL_CORE
Unable to offer due to clinical condition Airway patent, TM normal bilaterally, normal appearing mouth, nose, throat, neck supple with full range of motion, no cervical adenopathy.

## 2025-04-29 ENCOUNTER — NON-APPOINTMENT (OUTPATIENT)
Age: 55
End: 2025-04-29

## 2025-06-30 ENCOUNTER — NON-APPOINTMENT (OUTPATIENT)
Age: 55
End: 2025-06-30

## 2025-07-08 ENCOUNTER — NON-APPOINTMENT (OUTPATIENT)
Age: 55
End: 2025-07-08

## 2025-07-10 ENCOUNTER — APPOINTMENT (OUTPATIENT)
Dept: ORTHOPEDIC SURGERY | Facility: CLINIC | Age: 55
End: 2025-07-10